# Patient Record
Sex: MALE | Race: WHITE | Employment: OTHER | URBAN - METROPOLITAN AREA
[De-identification: names, ages, dates, MRNs, and addresses within clinical notes are randomized per-mention and may not be internally consistent; named-entity substitution may affect disease eponyms.]

---

## 2012-06-18 LAB — PSA, EXTERNAL: 0.9

## 2016-12-28 LAB — EF %, EXTERNAL: NORMAL

## 2017-01-03 ENCOUNTER — ANESTHESIA (OUTPATIENT)
Dept: SURGERY | Age: 62
DRG: 039 | End: 2017-01-03
Payer: COMMERCIAL

## 2017-01-03 PROCEDURE — 77030026438 HC STYL ET INTUB CARD -A: Performed by: ANESTHESIOLOGY

## 2017-01-03 PROCEDURE — 74011250636 HC RX REV CODE- 250/636

## 2017-01-03 PROCEDURE — 74011000250 HC RX REV CODE- 250

## 2017-01-03 PROCEDURE — 77030013079 HC BLNKT BAIR HGGR 3M -A: Performed by: ANESTHESIOLOGY

## 2017-01-03 PROCEDURE — 77030008684 HC TU ET CUF COVD -B: Performed by: ANESTHESIOLOGY

## 2017-01-03 RX ORDER — DEXAMETHASONE SODIUM PHOSPHATE 4 MG/ML
INJECTION, SOLUTION INTRA-ARTICULAR; INTRALESIONAL; INTRAMUSCULAR; INTRAVENOUS; SOFT TISSUE AS NEEDED
Status: DISCONTINUED | OUTPATIENT
Start: 2017-01-03 | End: 2017-01-03 | Stop reason: HOSPADM

## 2017-01-03 RX ORDER — HEPARIN SODIUM 1000 [USP'U]/ML
INJECTION, SOLUTION INTRAVENOUS; SUBCUTANEOUS AS NEEDED
Status: DISCONTINUED | OUTPATIENT
Start: 2017-01-03 | End: 2017-01-03 | Stop reason: HOSPADM

## 2017-01-03 RX ORDER — PROPOFOL 10 MG/ML
INJECTION, EMULSION INTRAVENOUS AS NEEDED
Status: DISCONTINUED | OUTPATIENT
Start: 2017-01-03 | End: 2017-01-03 | Stop reason: HOSPADM

## 2017-01-03 RX ORDER — PHENYLEPHRINE HCL IN 0.9% NACL 0.4MG/10ML
SYRINGE (ML) INTRAVENOUS AS NEEDED
Status: DISCONTINUED | OUTPATIENT
Start: 2017-01-03 | End: 2017-01-03 | Stop reason: HOSPADM

## 2017-01-03 RX ORDER — MORPHINE SULFATE 10 MG/ML
INJECTION, SOLUTION INTRAMUSCULAR; INTRAVENOUS AS NEEDED
Status: DISCONTINUED | OUTPATIENT
Start: 2017-01-03 | End: 2017-01-03 | Stop reason: HOSPADM

## 2017-01-03 RX ORDER — ONDANSETRON 2 MG/ML
INJECTION INTRAMUSCULAR; INTRAVENOUS AS NEEDED
Status: DISCONTINUED | OUTPATIENT
Start: 2017-01-03 | End: 2017-01-03 | Stop reason: HOSPADM

## 2017-01-03 RX ORDER — GLYCOPYRROLATE 0.2 MG/ML
INJECTION INTRAMUSCULAR; INTRAVENOUS AS NEEDED
Status: DISCONTINUED | OUTPATIENT
Start: 2017-01-03 | End: 2017-01-03 | Stop reason: HOSPADM

## 2017-01-03 RX ORDER — ESMOLOL HYDROCHLORIDE 10 MG/ML
INJECTION INTRAVENOUS AS NEEDED
Status: DISCONTINUED | OUTPATIENT
Start: 2017-01-03 | End: 2017-01-03 | Stop reason: HOSPADM

## 2017-01-03 RX ORDER — MIDAZOLAM HYDROCHLORIDE 1 MG/ML
INJECTION, SOLUTION INTRAMUSCULAR; INTRAVENOUS AS NEEDED
Status: DISCONTINUED | OUTPATIENT
Start: 2017-01-03 | End: 2017-01-03 | Stop reason: HOSPADM

## 2017-01-03 RX ORDER — NICARDIPINE HYDROCHLORIDE 0.2 MG/ML
INJECTION INTRAVENOUS
Status: DISCONTINUED | OUTPATIENT
Start: 2017-01-03 | End: 2017-01-03 | Stop reason: HOSPADM

## 2017-01-03 RX ORDER — NEOSTIGMINE METHYLSULFATE 1 MG/ML
INJECTION INTRAVENOUS AS NEEDED
Status: DISCONTINUED | OUTPATIENT
Start: 2017-01-03 | End: 2017-01-03 | Stop reason: HOSPADM

## 2017-01-03 RX ORDER — FENTANYL CITRATE 50 UG/ML
INJECTION, SOLUTION INTRAMUSCULAR; INTRAVENOUS AS NEEDED
Status: DISCONTINUED | OUTPATIENT
Start: 2017-01-03 | End: 2017-01-03 | Stop reason: HOSPADM

## 2017-01-03 RX ORDER — LIDOCAINE HYDROCHLORIDE 20 MG/ML
INJECTION, SOLUTION EPIDURAL; INFILTRATION; INTRACAUDAL; PERINEURAL AS NEEDED
Status: DISCONTINUED | OUTPATIENT
Start: 2017-01-03 | End: 2017-01-03 | Stop reason: HOSPADM

## 2017-01-03 RX ORDER — PROTAMINE SULFATE 10 MG/ML
INJECTION, SOLUTION INTRAVENOUS AS NEEDED
Status: DISCONTINUED | OUTPATIENT
Start: 2017-01-03 | End: 2017-01-03 | Stop reason: HOSPADM

## 2017-01-03 RX ORDER — ROCURONIUM BROMIDE 10 MG/ML
INJECTION, SOLUTION INTRAVENOUS AS NEEDED
Status: DISCONTINUED | OUTPATIENT
Start: 2017-01-03 | End: 2017-01-03 | Stop reason: HOSPADM

## 2017-01-03 RX ORDER — SUCCINYLCHOLINE CHLORIDE 20 MG/ML
INJECTION INTRAMUSCULAR; INTRAVENOUS AS NEEDED
Status: DISCONTINUED | OUTPATIENT
Start: 2017-01-03 | End: 2017-01-03 | Stop reason: HOSPADM

## 2017-01-03 RX ADMIN — HEPARIN SODIUM 5000 UNITS: 1000 INJECTION, SOLUTION INTRAVENOUS; SUBCUTANEOUS at 13:44

## 2017-01-03 RX ADMIN — DEXAMETHASONE SODIUM PHOSPHATE 4 MG: 4 INJECTION, SOLUTION INTRA-ARTICULAR; INTRALESIONAL; INTRAMUSCULAR; INTRAVENOUS; SOFT TISSUE at 13:33

## 2017-01-03 RX ADMIN — MIDAZOLAM HYDROCHLORIDE 2 MG: 1 INJECTION, SOLUTION INTRAMUSCULAR; INTRAVENOUS at 13:09

## 2017-01-03 RX ADMIN — SUCCINYLCHOLINE CHLORIDE 140 MG: 20 INJECTION INTRAMUSCULAR; INTRAVENOUS at 13:21

## 2017-01-03 RX ADMIN — PROPOFOL 50 MG: 10 INJECTION, EMULSION INTRAVENOUS at 13:26

## 2017-01-03 RX ADMIN — LIDOCAINE HYDROCHLORIDE 40 MG: 20 INJECTION, SOLUTION EPIDURAL; INFILTRATION; INTRACAUDAL; PERINEURAL at 13:21

## 2017-01-03 RX ADMIN — CEFAZOLIN 2 G: 1 INJECTION, POWDER, FOR SOLUTION INTRAMUSCULAR; INTRAVENOUS; PARENTERAL at 13:21

## 2017-01-03 RX ADMIN — ROCURONIUM BROMIDE 6 MG: 10 INJECTION, SOLUTION INTRAVENOUS at 13:21

## 2017-01-03 RX ADMIN — NEOSTIGMINE METHYLSULFATE 3 MG: 1 INJECTION INTRAVENOUS at 14:25

## 2017-01-03 RX ADMIN — MORPHINE SULFATE 4 MG: 10 INJECTION, SOLUTION INTRAMUSCULAR; INTRAVENOUS at 13:47

## 2017-01-03 RX ADMIN — FENTANYL CITRATE 100 MCG: 50 INJECTION, SOLUTION INTRAMUSCULAR; INTRAVENOUS at 13:21

## 2017-01-03 RX ADMIN — PROTAMINE SULFATE 50 MG: 10 INJECTION, SOLUTION INTRAVENOUS at 14:20

## 2017-01-03 RX ADMIN — GLYCOPYRROLATE 0.4 MG: 0.2 INJECTION INTRAMUSCULAR; INTRAVENOUS at 14:25

## 2017-01-03 RX ADMIN — Medication 80 MCG: at 13:35

## 2017-01-03 RX ADMIN — NICARDIPINE HYDROCHLORIDE 5 MG/HR: 0.2 INJECTION INTRAVENOUS at 14:41

## 2017-01-03 RX ADMIN — ESMOLOL HYDROCHLORIDE 30 MG: 10 INJECTION INTRAVENOUS at 13:27

## 2017-01-03 RX ADMIN — ONDANSETRON 4 MG: 2 INJECTION INTRAMUSCULAR; INTRAVENOUS at 14:23

## 2017-01-03 RX ADMIN — PROPOFOL 150 MG: 10 INJECTION, EMULSION INTRAVENOUS at 13:21

## 2017-01-03 RX ADMIN — ROCURONIUM BROMIDE 24 MG: 10 INJECTION, SOLUTION INTRAVENOUS at 13:28

## 2017-01-04 PROBLEM — I65.29 CAROTID STENOSIS, SYMPTOMATIC W/O INFARCT: Status: ACTIVE | Noted: 2017-01-04

## 2017-04-20 LAB — CREATININE, EXTERNAL: 1.08

## 2017-06-27 LAB — LDL-C, EXTERNAL: 79

## 2017-07-14 ENCOUNTER — TELEPHONE (OUTPATIENT)
Dept: INTERNAL MEDICINE CLINIC | Age: 62
End: 2017-07-14

## 2017-07-28 ENCOUNTER — LAB ONLY (OUTPATIENT)
Dept: INTERNAL MEDICINE CLINIC | Age: 62
End: 2017-07-28

## 2017-07-28 DIAGNOSIS — E78.5 HYPERLIPIDEMIA, UNSPECIFIED HYPERLIPIDEMIA TYPE: Primary | ICD-10-CM

## 2017-07-29 LAB
CHOLEST SERPL-MCNC: 213 MG/DL (ref 100–199)
HDLC SERPL-MCNC: 40 MG/DL
LDLC SERPL CALC-MCNC: 141 MG/DL (ref 0–99)
TRIGL SERPL-MCNC: 161 MG/DL (ref 0–149)
VLDLC SERPL CALC-MCNC: 32 MG/DL (ref 5–40)

## 2017-07-31 NOTE — PROGRESS NOTES
LDL and TG's elevated  Stop pravastatin and fenofibrate  Change to crestor 20 mg daily(if OK with him)

## 2017-08-02 RX ORDER — ROSUVASTATIN CALCIUM 20 MG/1
20 TABLET, COATED ORAL
Qty: 30 TAB | Refills: 6 | Status: SHIPPED | OUTPATIENT
Start: 2017-08-02 | End: 2018-02-13 | Stop reason: SDUPTHER

## 2017-08-02 NOTE — PROGRESS NOTES
Patients wife notified of results and order for Crestor will be sent to Dr. Fernando Bose to [facility] to [facility]

## 2017-09-13 PROBLEM — M25.571 RIGHT ANKLE PAIN: Status: ACTIVE | Noted: 2017-09-13

## 2017-09-13 PROBLEM — R05.9 COUGH: Status: ACTIVE | Noted: 2017-09-13

## 2017-09-13 PROBLEM — Z86.69 HISTORY OF GUILLAIN-BARRE SYNDROME: Status: ACTIVE | Noted: 2017-09-13

## 2017-09-13 PROBLEM — Z79.899 LONG-TERM USE OF HIGH-RISK MEDICATION: Status: ACTIVE | Noted: 2017-09-13

## 2017-09-13 PROBLEM — E78.5 DYSLIPIDEMIA: Status: ACTIVE | Noted: 2017-09-13

## 2017-09-13 PROBLEM — L30.9 HAND ECZEMA: Status: ACTIVE | Noted: 2017-09-13

## 2017-09-13 PROBLEM — G45.9 TIA (TRANSIENT ISCHEMIC ATTACK): Status: ACTIVE | Noted: 2017-09-13

## 2017-09-13 PROBLEM — I10 ESSENTIAL HYPERTENSION: Status: ACTIVE | Noted: 2017-09-13

## 2017-09-13 PROBLEM — M76.821 POSTERIOR TIBIAL TENDINITIS OF RIGHT LEG: Status: ACTIVE | Noted: 2017-09-13

## 2017-09-13 PROBLEM — I65.22 STENOSIS OF LEFT CAROTID ARTERY: Status: ACTIVE | Noted: 2017-09-13

## 2017-09-13 PROBLEM — B02.9 HERPES ZOSTER WITHOUT COMPLICATIONS: Status: ACTIVE | Noted: 2017-09-13

## 2017-09-13 RX ORDER — FENOFIBRATE 54 MG/1
TABLET ORAL DAILY
COMMUNITY
End: 2017-10-17 | Stop reason: ALTCHOICE

## 2017-10-17 ENCOUNTER — OFFICE VISIT (OUTPATIENT)
Dept: INTERNAL MEDICINE CLINIC | Age: 62
End: 2017-10-17

## 2017-10-17 VITALS
BODY MASS INDEX: 24.89 KG/M2 | DIASTOLIC BLOOD PRESSURE: 72 MMHG | HEART RATE: 72 BPM | HEIGHT: 72 IN | SYSTOLIC BLOOD PRESSURE: 124 MMHG | WEIGHT: 183.8 LBS

## 2017-10-17 DIAGNOSIS — I10 ESSENTIAL HYPERTENSION: Primary | ICD-10-CM

## 2017-10-17 DIAGNOSIS — R53.82 CHRONIC FATIGUE: ICD-10-CM

## 2017-10-17 DIAGNOSIS — G45.9 TRANSIENT CEREBRAL ISCHEMIA, UNSPECIFIED TYPE: ICD-10-CM

## 2017-10-17 DIAGNOSIS — E78.5 DYSLIPIDEMIA: ICD-10-CM

## 2017-10-17 DIAGNOSIS — Z79.899 LONG-TERM USE OF HIGH-RISK MEDICATION: ICD-10-CM

## 2017-10-17 NOTE — PATIENT INSTRUCTIONS
Fatigue: Care Instructions  Your Care Instructions  Fatigue is a feeling of tiredness, exhaustion, or lack of energy. You may feel fatigue because of too much or not enough activity. It can also come from stress, lack of sleep, boredom, and poor diet. Many medical problems, such as viral infections, can cause fatigue. Emotional problems, especially depression, are often the cause of fatigue. Fatigue is most often a symptom of another problem. Treatment for fatigue depends on the cause. For example, if you have fatigue because you have a certain health problem, treating this problem also treats your fatigue. If depression or anxiety is the cause, treatment may help. Follow-up care is a key part of your treatment and safety. Be sure to make and go to all appointments, and call your doctor if you are having problems. It's also a good idea to know your test results and keep a list of the medicines you take. How can you care for yourself at home? · Get regular exercise. But don't overdo it. Go back and forth between rest and exercise. · Get plenty of rest.  · Eat a healthy diet. Do not skip meals, especially breakfast.  · Reduce your use of caffeine, tobacco, and alcohol. Caffeine is most often found in coffee, tea, cola drinks, and chocolate. · Limit medicines that can cause fatigue. This includes tranquilizers and cold and allergy medicines. When should you call for help? Watch closely for changes in your health, and be sure to contact your doctor if:  · You have new symptoms such as fever or a rash. · Your fatigue gets worse. · You have been feeling down, depressed, or hopeless. Or you may have lost interest in things that you usually enjoy. · You are not getting better as expected. Where can you learn more? Go to http://matthew-migel.info/. Enter N952 in the search box to learn more about \"Fatigue: Care Instructions. \"  Current as of: March 20, 2017  Content Version: 11.3  © 0140-8925 Healthwise, Incorporated. Care instructions adapted under license by [employer] (which disclaims liability or warranty for this information). If you have questions about a medical condition or this instruction, always ask your healthcare professional. [place] [address] any warranty or liability for your use of this information.

## 2017-10-17 NOTE — PROGRESS NOTES
Dominique Mayorga is a 58 y.o. male presenting for Hypertension (6 mo fu)  . 1. Have you been to the ER, urgent care clinic since your last visit? Hospitalized since your last visit? No    2. Have you seen or consulted any other health care providers outside of the [address] [place] [address] since your last visit? Include any pap smears or colon screening. Yes When: 8-25-17 Where: Dr Shanna Louise for visit: doppler fu    No flowsheet data found. No flowsheet data found. PHQ over the last two weeks 10/17/2017   Little interest or pleasure in doing things Not at all   Feeling down, depressed or hopeless Not at all   Total Score PHQ 2 0       There are no discontinued medications.

## 2017-10-17 NOTE — MR AVS SNAPSHOT
Visit Information Date & Time Provider Department Dept. Phone Encounter #  
 10/17/2017  8:40 AM Aislinn CaKate [id number] Follow-up Instructions Return in about 6 months (around 4/17/2018). Follow-up and Disposition History Upcoming Health Maintenance Date Due Hepatitis C Screening 1955 DTaP/Tdap/Td series (1 - Tdap) 9/25/1976 FOBT Q 1 YEAR AGE 50-75 9/25/2005 ZOSTER VACCINE AGE 60> 7/25/2015 INFLUENZA AGE 9 TO ADULT 8/1/2017 Allergies as of 10/17/2017  Review Complete On: 10/17/2017 By: Aislinn Ca MD  
  
 Severity Noted Reaction Type Reactions Influenza Virus Vaccine, Specific  01/03/2017    Other (comments) Best Covarrubias Current Immunizations  Never Reviewed No immunizations on file. Not reviewed this visit You Were Diagnosed With   
  
 Codes Comments Essential hypertension    -  Primary ICD-10-CM: I10 
ICD-9-CM: 401.9 Dyslipidemia     ICD-10-CM: E78.5 ICD-9-CM: 272.4 Long-term use of high-risk medication     ICD-10-CM: Z79.899 ICD-9-CM: V58.69 Transient cerebral ischemia, unspecified type     ICD-10-CM: G45.9 ICD-9-CM: 435.9 Chronic fatigue     ICD-10-CM: R53.82 
ICD-9-CM: 780.79 Vitals BP Pulse Height(growth percentile) Weight(growth percentile) BMI Smoking Status 124/72 (BP 1 Location: Right arm, BP Patient Position: Sitting) 72 6' (1.829 m) 183 lb 12.8 oz (83.4 kg) 24.93 kg/m2 Never Smoker BMI and BSA Data Body Mass Index Body Surface Area 24.93 kg/m 2 2.06 m 2 Preferred Pharmacy Pharmacy Name Phone [id number] [facility] [phone] Your Updated Medication List  
  
   
This list is accurate as of: 10/17/17  9:26 AM.  Always use your most recent med list.  ALLEGRA PO  
 Take 1 Tab by mouth every morning. aspirin delayed-release 81 mg tablet Take 1 Tab by mouth daily. multivitamin tablet Commonly known as:  ONE A DAY Take 1 Tab by mouth daily. rosuvastatin 20 mg tablet Commonly known as:  CRESTOR Take 1 Tab by mouth nightly. valsartan 80 mg tablet Commonly known as:  DIOVAN Take 160 mg by mouth nightly. We Performed the Following CBC WITH AUTOMATED DIFF [21822 CPT(R)] CK [id number] CPT(R)] HEMOGLOBIN A1C WITH EAG [16666 CPT(R)] LIPID PANEL [55728 CPT(R)] METABOLIC PANEL, COMPREHENSIVE [43427 CPT(R)] T4, FREE [id number] CPT(R)] TSH 3RD GENERATION [93913 CPT(R)] URINALYSIS W/ RFLX MICROSCOPIC [70157 CPT(R)] Follow-up Instructions Return in about 6 months (around 4/17/2018). Patient Instructions Fatigue: Care Instructions Your Care Instructions Fatigue is a feeling of tiredness, exhaustion, or lack of energy. You may feel fatigue because of too much or not enough activity. It can also come from stress, lack of sleep, boredom, and poor diet. Many medical problems, such as viral infections, can cause fatigue. Emotional problems, especially depression, are often the cause of fatigue. Fatigue is most often a symptom of another problem. Treatment for fatigue depends on the cause. For example, if you have fatigue because you have a certain health problem, treating this problem also treats your fatigue. If depression or anxiety is the cause, treatment may help. Follow-up care is a key part of your treatment and safety. Be sure to make and go to all appointments, and call your doctor if you are having problems. It's also a good idea to know your test results and keep a list of the medicines you take. How can you care for yourself at home? · Get regular exercise. But don't overdo it. Go back and forth between rest and exercise.  
· Get plenty of rest. 
 · Eat a healthy diet. Do not skip meals, especially breakfast. 
· Reduce your use of caffeine, tobacco, and alcohol. Caffeine is most often found in coffee, tea, cola drinks, and chocolate. · Limit medicines that can cause fatigue. This includes tranquilizers and cold and allergy medicines. When should you call for help? Watch closely for changes in your health, and be sure to contact your doctor if: 
· You have new symptoms such as fever or a rash. · Your fatigue gets worse. · You have been feeling down, depressed, or hopeless. Or you may have lost interest in things that you usually enjoy. · You are not getting better as expected. Where can you learn more? Go to http://matthew-migel.info/. Enter B896 in the search box to learn more about \"Fatigue: Care Instructions. \" Current as of: March 20, 2017 Content Version: 11.3 © 4105-9042 [employer]. Care instructions adapted under license by [employer] (which disclaims liability or warranty for this information). If you have questions about a medical condition or this instruction, always ask your healthcare professional. [place] [address] any warranty or liability for your use of this information. Patient Instructions History Introducing [facility] HE[facility] SERVICES! Dear Yissel Velasquez: Thank you for requesting a [employer] account. Our records indicate that you have previously registered for a [employer] account but its currently inactive. Please call our [employer] support line at [phone]. Additional Information If you have questions, please visit the Frequently Asked Questions section of the [employer] website at [email or web address] Remember, [employer] is NOT to be used for urgent needs. For medical emergencies, dial 911. Now available from your iPhone and Android! Please provide this summary of care documentation to your next provider. Your primary care clinician is listed as ANNA Torre. If you have any questions after today's visit, please call [phone].

## 2017-10-18 LAB
ALBUMIN SERPL-MCNC: 4.8 G/DL (ref 3.6–4.8)
ALBUMIN/GLOB SERPL: 1.8 {RATIO} (ref 1.2–2.2)
ALP SERPL-CCNC: 101 IU/L (ref 39–117)
ALT SERPL-CCNC: 36 IU/L (ref 0–44)
APPEARANCE UR: CLEAR
AST SERPL-CCNC: 33 IU/L (ref 0–40)
BASOPHILS # BLD AUTO: 0 X10E3/UL (ref 0–0.2)
BASOPHILS NFR BLD AUTO: 1 %
BILIRUB SERPL-MCNC: 1.2 MG/DL (ref 0–1.2)
BILIRUB UR QL STRIP: NEGATIVE
BUN SERPL-MCNC: 10 MG/DL (ref 8–27)
BUN/CREAT SERPL: 9 (ref 10–24)
CALCIUM SERPL-MCNC: 9.8 MG/DL (ref 8.6–10.2)
CHLORIDE SERPL-SCNC: 102 MMOL/L (ref 96–106)
CHOLEST SERPL-MCNC: 158 MG/DL (ref 100–199)
CK SERPL-CCNC: 58 U/L (ref 24–204)
CO2 SERPL-SCNC: 25 MMOL/L (ref 18–29)
COLOR UR: YELLOW
CREAT SERPL-MCNC: 1.14 MG/DL (ref 0.76–1.27)
EOSINOPHIL # BLD AUTO: 0.2 X10E3/UL (ref 0–0.4)
EOSINOPHIL NFR BLD AUTO: 2 %
ERYTHROCYTE [DISTWIDTH] IN BLOOD BY AUTOMATED COUNT: 13 % (ref 12.3–15.4)
EST. AVERAGE GLUCOSE BLD GHB EST-MCNC: 120 MG/DL
GLOBULIN SER CALC-MCNC: 2.7 G/DL (ref 1.5–4.5)
GLUCOSE SERPL-MCNC: 119 MG/DL (ref 65–99)
GLUCOSE UR QL: NEGATIVE
HBA1C MFR BLD: 5.8 % (ref 4.8–5.6)
HCT VFR BLD AUTO: 45.3 % (ref 37.5–51)
HDLC SERPL-MCNC: 40 MG/DL
HGB BLD-MCNC: 15.4 G/DL (ref 12.6–17.7)
HGB UR QL STRIP: NEGATIVE
IMM GRANULOCYTES # BLD: 0 X10E3/UL (ref 0–0.1)
IMM GRANULOCYTES NFR BLD: 0 %
KETONES UR QL STRIP: NEGATIVE
LDLC SERPL CALC-MCNC: 83 MG/DL (ref 0–99)
LEUKOCYTE ESTERASE UR QL STRIP: NEGATIVE
LYMPHOCYTES # BLD AUTO: 1.5 X10E3/UL (ref 0.7–3.1)
LYMPHOCYTES NFR BLD AUTO: 20 %
MCH RBC QN AUTO: 31.2 PG (ref 26.6–33)
MCHC RBC AUTO-ENTMCNC: 34 G/DL (ref 31.5–35.7)
MCV RBC AUTO: 92 FL (ref 79–97)
MICRO URNS: NORMAL
MONOCYTES # BLD AUTO: 0.9 X10E3/UL (ref 0.1–0.9)
MONOCYTES NFR BLD AUTO: 12 %
NEUTROPHILS # BLD AUTO: 4.9 X10E3/UL (ref 1.4–7)
NEUTROPHILS NFR BLD AUTO: 65 %
NITRITE UR QL STRIP: NEGATIVE
PH UR STRIP: 5.5 [PH] (ref 5–7.5)
PLATELET # BLD AUTO: 216 X10E3/UL (ref 150–379)
POTASSIUM SERPL-SCNC: 4.6 MMOL/L (ref 3.5–5.2)
PROT SERPL-MCNC: 7.5 G/DL (ref 6–8.5)
PROT UR QL STRIP: NEGATIVE
RBC # BLD AUTO: 4.93 X10E6/UL (ref 4.14–5.8)
SODIUM SERPL-SCNC: 141 MMOL/L (ref 134–144)
SP GR UR: 1.01 (ref 1–1.03)
T4 FREE SERPL-MCNC: 1.07 NG/DL (ref 0.82–1.77)
TRIGL SERPL-MCNC: 173 MG/DL (ref 0–149)
TSH SERPL DL<=0.005 MIU/L-ACNC: 1.05 UIU/ML (ref 0.45–4.5)
UROBILINOGEN UR STRIP-MCNC: 0.2 MG/DL (ref 0.2–1)
VLDLC SERPL CALC-MCNC: 35 MG/DL (ref 5–40)
WBC # BLD AUTO: 7.4 X10E3/UL (ref 3.4–10.8)

## 2017-10-18 NOTE — PROGRESS NOTES
His labs are all stable and there is nothing that gives us any indication of why he is suffering with such fatigue.   Should consider having a sleep lab study done to rule out obstructive sleep apnea

## 2018-02-12 RX ORDER — VALSARTAN 160 MG/1
TABLET ORAL
Qty: 30 TAB | Refills: 11 | Status: SHIPPED | OUTPATIENT
Start: 2018-02-12 | End: 2018-02-14 | Stop reason: SDUPTHER

## 2018-02-14 RX ORDER — VALSARTAN 160 MG/1
TABLET ORAL
Qty: 30 TAB | Refills: 11 | Status: SHIPPED | OUTPATIENT
Start: 2018-02-14 | End: 2018-02-19 | Stop reason: SDUPTHER

## 2018-02-19 ENCOUNTER — OFFICE VISIT (OUTPATIENT)
Dept: INTERNAL MEDICINE CLINIC | Age: 63
End: 2018-02-19

## 2018-02-19 VITALS
DIASTOLIC BLOOD PRESSURE: 82 MMHG | HEIGHT: 72 IN | BODY MASS INDEX: 24.38 KG/M2 | HEART RATE: 69 BPM | SYSTOLIC BLOOD PRESSURE: 158 MMHG | WEIGHT: 180 LBS | RESPIRATION RATE: 20 BRPM | TEMPERATURE: 97.9 F

## 2018-02-19 DIAGNOSIS — L23.9 ALLERGIC DERMATITIS: Primary | ICD-10-CM

## 2018-02-19 RX ORDER — PREDNISONE 5 MG/1
TABLET ORAL
Qty: 30 TAB | Refills: 0 | Status: SHIPPED | OUTPATIENT
Start: 2018-02-19 | End: 2018-03-19 | Stop reason: ALTCHOICE

## 2018-02-19 NOTE — MR AVS SNAPSHOT
Moshe Arriaga 
 
 
 [address] [address] [phone] Patient: Anila Leong MRN: [id number] HIN:[birth year] Visit Information Date & Time Provider Department Dept. Phone Encounter #  
 2/19/2018  3:50 PM Cherri Lindquist MD [facility] [id number] Follow-up Instructions Return for As previously scheduled. Your Appointments 4/20/2018  8:00 AM  
FOLLOW UP 10 with MD Marcie Zapata [address] ([facility]) Appt Note: 6 mo fu  
 [address] [address] [address] [address] Upcoming Health Maintenance Date Due Hepatitis C Screening 1955 DTaP/Tdap/Td series (1 - Tdap) 9/25/1976 FOBT Q 1 YEAR AGE 50-75 9/25/2005 ZOSTER VACCINE AGE 60> 7/25/2015 Influenza Age 5 to Adult 8/1/2017 Allergies as of 2/19/2018  Review Complete On: 2/19/2018 By: Cherri Lindquist MD  
  
 Severity Noted Reaction Type Reactions Influenza Virus Vaccine, Specific  01/03/2017    Other (comments) Pearl Carvajal Current Immunizations  Never Reviewed No immunizations on file. Not reviewed this visit You Were Diagnosed With   
  
 Codes Comments Allergic dermatitis    -  Primary ICD-10-CM: L23.9 ICD-9-CM: 692.9 Vitals BP Pulse Temp Resp Height(growth percentile) Weight(growth percentile) 158/82 (BP 1 Location: Left arm, BP Patient Position: Sitting) 69 97.9 °F (36.6 °C) (Oral) 20 6' (1.829 m) 180 lb (81.6 kg) BMI Smoking Status 24.41 kg/m2 Never Smoker Vitals History BMI and BSA Data Body Mass Index Body Surface Area  
 24.41 kg/m 2 2.04 m 2 Preferred Pharmacy Pharmacy Name Phone  [id number] [facility] [phone] Your Updated Medication List  
  
   
This list is accurate as of: 18  4:22 PM.  Always use your most recent med list. ALLEGRA PO Take 1 Tab by mouth as needed. aspirin delayed-release 81 mg tablet Take 1 Tab by mouth daily. multivitamin tablet Commonly known as:  ONE A DAY Take 1 Tab by mouth daily. predniSONE 5 mg tablet Commonly known as:  DELTASONE  
5 tablets day for days 1 through 4, then 4 tablets on day 5, then 3 tablets on day 6, then 2 tablets on day 7, then 1 tablet on day 8.  
  
 rosuvastatin 20 mg tablet Commonly known as:  CRESTOR  
TAKE 1 TAB BY MOUTH NIGHTLY.  
  
 valsartan 80 mg tablet Commonly known as:  DIOVAN Take 160 mg by mouth nightly. Prescriptions Sent to Pharmacy Refills  
 predniSONE (DELTASONE) 5 mg tablet 0 Si tablets day for days 1 through 4, then 4 tablets on day 5, then 3 tablets on day 6, then 2 tablets on day 7, then 1 tablet on day 8. Class: Normal  
 Pharmacy: [address] #: [phone] Follow-up Instructions Return for As previously scheduled. Patient Instructions Dermatitis: Care Instructions Your Care Instructions Dermatitis is the general name used for any rash or inflammation of the skin. Different kinds of dermatitis cause different kinds of rashes. Common causes of a rash include new medicines, plants (such as poison oak or poison ivy), heat, and stress. Certain illnesses can also cause a rash. An allergic reaction to something that touches your skin, such as latex, nickel, or poison ivy, is called contact dermatitis. Contact dermatitis may also be caused by something that irritates the skin, such as bleach, a chemical, or soap. These types of rashes cannot be spread from person to person. How long your rash will last depends on what caused it. Rashes may last a few days or months. Follow-up care is a key part of your treatment and safety. Be sure to make and go to all appointments, and call your doctor if you are having problems. It's also a good idea to know your test results and keep a list of the medicines you take. How can you care for yourself at home? · Do not scratch the rash. Cut your nails short, and file them smooth. Or wear gloves if this helps keep you from scratching. · Wash the area with water only. Pat dry. · Put cold, wet cloths on the rash to reduce itching. · Keep cool, and stay out of the sun. · Leave the rash open to the air as much as possible. · If the rash itches, use hydrocortisone cream. Follow the directions on the label. Calamine lotion may help for plant rashes. · Take an over-the-counter antihistamine, such as diphenhydramine (Benadryl) or loratadine (Claritin), to help calm the itching. Read and follow all instructions on the label. · If your doctor prescribed a cream, use it as directed. If your doctor prescribed medicine, take it exactly as directed. When should you call for help? Call your doctor now or seek immediate medical care if: 
? · You have symptoms of infection, such as: 
¨ Increased pain, swelling, warmth, or redness. ¨ Red streaks leading from the area. ¨ Pus draining from the area. ¨ A fever. ? · You have joint pain along with the rash. ? Watch closely for changes in your health, and be sure to contact your doctor if: 
? · Your rash is changing or getting worse. ? · You are not getting better as expected. Where can you learn more? Go to http://matthew-migel.info/. Enter [phone] [phone] in the search box to learn more about \"Dermatitis: Care Instructions. \" Current as of: October 13, 2016 Content Version: 11.4 © 6063-5343 Healthwise, Incorporated.  Care instructions adapted under license by [employer]ra [employer] (which disclaims liability or warranty for this information). If you have questions about a medical condition or this instruction, always ask your healthcare professional. [address] any warranty or liability for your use of this information. Introducing [facility] & HE[facility] SERVICES! Dear Cristy Davis: Thank you for requesting a [employer] account. Our records indicate that you have previously registered for a [employer] account but its currently inactive. Please call our [employer] support line at [phone]. Additional Information If you have questions, please visit the Frequently Asked Questions section of the [employer] website at [email or web address] Remember, [employer] is NOT to be used for urgent needs. For medical emergencies, dial 911. Now available from your iPhone and Android! Please provide this summary of care documentation to your next provider. Your primary care clinician is listed as ANNA Torre. If you have any questions after today's visit, please call [phone].

## 2018-02-19 NOTE — PROGRESS NOTES
Chief Complaint   Patient presents with    Rash     Patient stated he has a rash on his back, lower arms, and left upper thigh and he has been itching. 1. Have you been to the ER, urgent care clinic since your last visit? Hospitalized since your last visit? NO    2. Have you seen or consulted any other health care providers outside of the [address] [place] [address] since your last visit? Include any pap smears or colon screening.  NO

## 2018-02-19 NOTE — PATIENT INSTRUCTIONS
Dermatitis: Care Instructions  Your Care Instructions  Dermatitis is the general name used for any rash or inflammation of the skin. Different kinds of dermatitis cause different kinds of rashes. Common causes of a rash include new medicines, plants (such as poison oak or poison ivy), heat, and stress. Certain illnesses can also cause a rash. An allergic reaction to something that touches your skin, such as latex, nickel, or poison ivy, is called contact dermatitis. Contact dermatitis may also be caused by something that irritates the skin, such as bleach, a chemical, or soap. These types of rashes cannot be spread from person to person. How long your rash will last depends on what caused it. Rashes may last a few days or months. Follow-up care is a key part of your treatment and safety. Be sure to make and go to all appointments, and call your doctor if you are having problems. It's also a good idea to know your test results and keep a list of the medicines you take. How can you care for yourself at home? · Do not scratch the rash. Cut your nails short, and file them smooth. Or wear gloves if this helps keep you from scratching. · Wash the area with water only. Pat dry. · Put cold, wet cloths on the rash to reduce itching. · Keep cool, and stay out of the sun. · Leave the rash open to the air as much as possible. · If the rash itches, use hydrocortisone cream. Follow the directions on the label. Calamine lotion may help for plant rashes. · Take an over-the-counter antihistamine, such as diphenhydramine (Benadryl) or loratadine (Claritin), to help calm the itching. Read and follow all instructions on the label. · If your doctor prescribed a cream, use it as directed. If your doctor prescribed medicine, take it exactly as directed. When should you call for help?   Call your doctor now or seek immediate medical care if:  ? · You have symptoms of infection, such as:  ¨ Increased pain, swelling, warmth, or redness. ¨ Red streaks leading from the area. ¨ Pus draining from the area. ¨ A fever. ? · You have joint pain along with the rash. ? Watch closely for changes in your health, and be sure to contact your doctor if:  ? · Your rash is changing or getting worse. ? · You are not getting better as expected. Where can you learn more? Go to http://matthew-migel.info/. Enter ([phone] 4443 5115 in the search box to learn more about \"Dermatitis: Care Instructions. \"  Current as of: October 13, 2016  Content Version: 11.4  © 3081-6016 [employer]. Care instructions adapted under license by [employer] (which disclaims liability or warranty for this information). If you have questions about a medical condition or this instruction, always ask your healthcare professional. [address] any warranty or liability for your use of this information.

## 2018-02-19 NOTE — PROGRESS NOTES
This note will not be viewable in [address] Subjective:     Mr. Radha Sánchez presents to the office today with complaints of an itchy rash. Patient states that is been present for at least 2 weeks and is been on his arms his chest and abdomen is back and his legs. He has had no fevers, chills or other constitutional symptoms. He notes that the family has not changed any soaps, detergents, fabric softeners etc.  He has Allegra available to take for allergies but has not taken any recently. He has been using a back scratcher on the areas and they are excoriated. The patient does have a history of hand eczema which is been chronic. He has not been on any new medications.     Past Medical History:   Diagnosis Date    Cough 9/13/2017    Dyslipidemia 9/13/2017    Eczema     to hands    Essential hypertension 9/13/2017    Guillain Barré syndrome ([facility] [address]     states due to flu shot 40 years ago    Hand eczema 9/13/2017    Herpes zoster without complications 3/97/1363    High cholesterol     History of Guillain-Catron syndrome 9/13/2017    Hx of chest tube placement 1976    Hypertension     Ill-defined condition     polypneumothroax age 24    Long-term use of high-risk medication 9/13/2017    Posterior tibial tendinitis of right leg 9/13/2017    Right ankle pain 9/13/2017    Stenosis of left carotid artery 9/13/2017    Stroke ([facility] [address] 12/21/2016    right leg and facial weakness-no deficits presently but general weakness    TIA (transient ischemic attack) 9/13/2017     Past Surgical History:   Procedure Laterality Date    HX HEENT      septoplasty    HX OTHER SURGICAL      colonoscopy    HX TONSILLECTOMY       Allergies   Allergen Reactions    Influenza Virus Vaccine, Specific Other (comments)     Malou Gutierrez     Current Outpatient Prescriptions   Medication Sig Dispense Refill    predniSONE (DELTASONE) 5 mg tablet 5 tablets day for days 1 through 4, then 4 tablets on day 5, then 3 tablets on day 6, then 2 tablets on day 7, then 1 tablet on day 8. 30 Tab 0    rosuvastatin (CRESTOR) 20 mg tablet TAKE 1 TAB BY MOUTH NIGHTLY. 30 Tab 12    valsartan (DIOVAN) 80 mg tablet Take 160 mg by mouth nightly.  FEXOFENADINE HCL (ALLEGRA PO) Take 1 Tab by mouth as needed.  multivitamin (ONE A DAY) tablet Take 1 Tab by mouth daily.  aspirin delayed-release 81 mg tablet Take 1 Tab by mouth daily. Social History     Social History    Marital status:      Spouse name: N/A    Number of children: N/A    Years of education: N/A     Social History Main Topics    Smoking status: Never Smoker    Smokeless tobacco: Never Used    Alcohol use 12.0 oz/week     20 Cans of beer per week      Comment: none in 10 days    Drug use: No    Sexual activity: Not Asked     Other Topics Concern    None     Social History Narrative     Family History   Problem Relation Age of Onset    No Known Problems Mother     Diabetes Father     Hypertension Father     Cataract Father        Review of Systems:  GEN: no weight loss, weight gain, fatigue or night sweats  CV: no PND, orthopnea, or palpitations  Resp: no dyspnea on exertion, no cough  Abd: no nausea, vomiting or diarrhea  EXT: denies edema, claudication  Derm: Complains of diffuse, itchy rash  Neurological ROS: no TIA or stroke symptoms  ROS otherwise negative      Objective:     Visit Vitals    /82 (BP 1 Location: Left arm, BP Patient Position: Sitting)    Pulse 69    Temp 97.9 °F (36.6 °C) (Oral)    Resp 20    Ht 6' (1.829 m)    Wt 180 lb (81.6 kg)    BMI 24.41 kg/m2     Body mass index is 24.41 kg/(m^2). General:   alert, cooperative and no distress   Eyes: conjunctivae/sclerae clear.  PERRL, EOM's intact   Mouth:  No oral lesions, no pharyngeal erythema, no exudates   Neck: Trachea midline, no thyromegaly, no bruits   Heart: S1 and S2 normal,no murmurs noted    Lungs: Clear to auscultation bilaterally, no increased work of breathing   Abdomen: Soft, nontender. Normal bowel sounds   Derm:  There is a diffuse excoriated rash present on his arms chest abdomen back and legs. There are no vesicles present. There are no hives or bull's-eye lesions. He does have bilateral hand eczema with some mild cracking present. Neuro: ..alert, oriented x3,speech normal in context and clarity, cranial nerves II-XII intact,motor strength: full proximally and distally,gait: normal  reflexes: full and symmetric     Physical exam otherwise negative         Assessment/Plan:     Diagnoses and all orders for this visit:    Allergic dermatitis  -     predniSONE (DELTASONE) 5 mg tablet; 5 tablets day for days 1 through 4, then 4 tablets on day 5, then 3 tablets on day 6, then 2 tablets on day 7, then 1 tablet on day 8., Normal, Disp-30 Tab, R-0        Other instructions: The patient has a diffuse excoriated dermatitis. No vesicles were seen. He does have chronic hand eczema. I believe that this is an allergic dermatitis and we will treat him with a tapering course of prednisone and I have asked him to restart his Allegra. Should there be no improvement in the rash I have recommended a dermatology evaluation    Follow-up Disposition:  Return for As previously scheduled.     Vitaliy Morel MD

## 2018-03-19 ENCOUNTER — OFFICE VISIT (OUTPATIENT)
Dept: INTERNAL MEDICINE CLINIC | Age: 63
End: 2018-03-19

## 2018-03-19 VITALS
DIASTOLIC BLOOD PRESSURE: 78 MMHG | SYSTOLIC BLOOD PRESSURE: 116 MMHG | HEIGHT: 72 IN | TEMPERATURE: 98.1 F | WEIGHT: 179.8 LBS | BODY MASS INDEX: 24.35 KG/M2

## 2018-03-19 DIAGNOSIS — J20.9 ACUTE BRONCHITIS, UNSPECIFIED ORGANISM: Primary | ICD-10-CM

## 2018-03-19 RX ORDER — CEFUROXIME AXETIL 250 MG/1
250 TABLET ORAL 2 TIMES DAILY
Qty: 40 TAB | Refills: 0 | Status: SHIPPED | OUTPATIENT
Start: 2018-03-19 | End: 2018-04-19 | Stop reason: ALTCHOICE

## 2018-03-19 NOTE — PROGRESS NOTES
This note will not be viewable in [address] Fatoumata Giang is a 58 y.o. male and presents with Cough  . Subjective:  Jose Elias Pretty presents to the office today with complaints of an upper respiratory infection ongoing over the last week with the development of a congested cough. The cough is been productive of a purulent phlegm. Initially he had some feverishness without chills. He denies wheezing, shortness of breath or pleuritic pain. Past Medical History:   Diagnosis Date    Cough 9/13/2017    Dyslipidemia 9/13/2017    Eczema     to hands    Essential hypertension 9/13/2017    Guillain Barré syndrome ([facility] [address]     states due to flu shot 40 years ago    Hand eczema 9/13/2017    Herpes zoster without complications 3/32/8606    High cholesterol     History of Guillain-Agenda syndrome 9/13/2017    Hx of chest tube placement 1976    Hypertension     Ill-defined condition     polypneumothroax age 24    Long-term use of high-risk medication 9/13/2017    Posterior tibial tendinitis of right leg 9/13/2017    Right ankle pain 9/13/2017    Stenosis of left carotid artery 9/13/2017    Stroke ([facility] [address] 12/21/2016    right leg and facial weakness-no deficits presently but general weakness    TIA (transient ischemic attack) 9/13/2017     Past Surgical History:   Procedure Laterality Date    HX HEENT      septoplasty    HX OTHER SURGICAL      colonoscopy    HX TONSILLECTOMY       Allergies   Allergen Reactions    Influenza Virus Vaccine, Specific Other (comments)     Malou Gutierrez     Current Outpatient Prescriptions   Medication Sig Dispense Refill    cefUROXime (CEFTIN) 250 mg tablet Take 1 Tab by mouth two (2) times a day. 40 Tab 0    rosuvastatin (CRESTOR) 20 mg tablet TAKE 1 TAB BY MOUTH NIGHTLY. 30 Tab 12    valsartan (DIOVAN) 80 mg tablet Take 160 mg by mouth nightly.  FEXOFENADINE HCL (ALLEGRA PO) Take 1 Tab by mouth as needed.  multivitamin (ONE A DAY) tablet Take 1 Tab by mouth daily.  aspirin delayed-release 81 mg tablet Take 1 Tab by mouth daily. Social History     Social History    Marital status:      Spouse name: N/A    Number of children: N/A    Years of education: N/A     Social History Main Topics    Smoking status: Never Smoker    Smokeless tobacco: Never Used    Alcohol use 12.0 oz/week     20 Cans of beer per week      Comment: none in 10 days    Drug use: No    Sexual activity: Not Asked     Other Topics Concern    None     Social History Narrative     Family History   Problem Relation Age of Onset    No Known Problems Mother     Diabetes Father     Hypertension Father     Cataract Father        Review of Systems  Constitutional: negative for fevers, chills, anorexia and weight loss  Eyes:   negative for visual disturbance and irritation  ENT:   Positive for some sinus congestion and post nasal drainage. Respiratory:  Positive for cough and chest congestion without wheezing  CV:   negative for chest pain, palpitations, lower extremity edema  GI:   negative for nausea, vomiting, diarrhea, abdominal pain,melena  Integumentary: negative for rash and pruritus  Neurological:  negative for headaches, dizziness, vertigo, memory problems and gait       Objective:  Visit Vitals    /78 (BP 1 Location: Left arm, BP Patient Position: Sitting)    Temp 98.1 °F (36.7 °C) (Oral)    Ht 6' (1.829 m)    Wt 179 lb 12.8 oz (81.6 kg)    BMI 24.39 kg/m2     Body mass index is 24.39 kg/(m^2). Physical Exam:   General appearance - alert, ill appearing, and in no distress  Mental status - alert, oriented to person, place, and time  EYE-GAURAV, EOMI, conjuctiva clear. No lid swelling or purulent drainage  ENT- TM's clear without A/F level.  Pharynx slightly erythematous with drainage noted  Nose - normal and patent, no erythema,  Neck - supple, no significant adenopathy   Chest - Coarse upper airway rhonchi present without wheezing   Heart - normal rate, regular rhythm, normal S1, S2, no murmurs, rubs, clicks or gallops   Skin-No rash appreciated  Neuro -alert, oriented, normal speech, no focal findings. Assessment/Plan:  Diagnoses and all orders for this visit:    Acute bronchitis, unspecified organism  -     cefUROXime (CEFTIN) 250 mg tablet; Take 1 Tab by mouth two (2) times a day., Normal, Disp-40 Tab, R-0        Other Instructions:  Mucinex as directed    Increase po fluids    Follow-up Disposition:  Return if symptoms worsen or fail to improve. I have reviewed with the patient details of the assessment and plan and all questions were answered. Relevent patient education was performed. An After Visit Summary was printed and given to the patient.     Yadiel Avalos MD

## 2018-03-19 NOTE — PATIENT INSTRUCTIONS
Bronchitis: Care Instructions  Your Care Instructions    Bronchitis is inflammation of the bronchial tubes, which carry air to the lungs. The tubes swell and produce mucus, or phlegm. The mucus and inflamed bronchial tubes make you cough. You may have trouble breathing. Most cases of bronchitis are caused by viruses like those that cause colds. Antibiotics usually do not help and they may be harmful. Bronchitis usually develops rapidly and lasts about 2 to 3 weeks in otherwise healthy people. Follow-up care is a key part of your treatment and safety. Be sure to make and go to all appointments, and call your doctor if you are having problems. It's also a good idea to know your test results and keep a list of the medicines you take. How can you care for yourself at home? · Take all medicines exactly as prescribed. Call your doctor if you think you are having a problem with your medicine. · Get some extra rest.  · Take an over-the-counter pain medicine, such as acetaminophen (Tylenol), ibuprofen (Advil, Motrin), or naproxen (Aleve) to reduce fever and relieve body aches. Read and follow all instructions on the label. · Do not take two or more pain medicines at the same time unless the doctor told you to. Many pain medicines have acetaminophen, which is Tylenol. Too much acetaminophen (Tylenol) can be harmful. · Take an over-the-counter cough medicine that contains dextromethorphan to help quiet a dry, hacking cough so that you can sleep. Avoid cough medicines that have more than one active ingredient. Read and follow all instructions on the label. · Breathe moist air from a humidifier, hot shower, or sink filled with hot water. The heat and moisture will thin mucus so you can cough it out. · Do not smoke. Smoking can make bronchitis worse. If you need help quitting, talk to your doctor about stop-smoking programs and medicines. These can increase your chances of quitting for good.   When should you call for help? Call 911 anytime you think you may need emergency care. For example, call if:  ? · You have severe trouble breathing. ?Call your doctor now or seek immediate medical care if:  ? · You have new or worse trouble breathing. ? · You cough up dark brown or bloody mucus (sputum). ? · You have a new or higher fever. ? · You have a new rash. ? Watch closely for changes in your health, and be sure to contact your doctor if:  ? · You cough more deeply or more often, especially if you notice more mucus or a change in the color of your mucus. ? · You are not getting better as expected. Where can you learn more? Go to http://matthew-migel.info/. Enter H333 in the search box to learn more about \"Bronchitis: Care Instructions. \"  Current as of: May 12, 2017  Content Version: 11.4  © 3833-9035 [employer]. Care instructions adapted under license by [employer]) by [employer] (which disclaims liability or warranty for this information). If you have questions about a medical condition or this instruction, always ask your healthcare professional. [address] any warranty or liability for your use of this information.

## 2018-03-19 NOTE — MR AVS SNAPSHOT
[facility] 
 
 
 [address] [id number] [phone] Patient: Cade Bahena MRN: [id number] PMO:[birth year] Visit Information Date & Time Provider Department Dept. Phone Encounter #  
 3/19/2018  2:40 PM Marquita Tobias MD [facility] [id number] Follow-up Instructions Return if symptoms worsen or fail to improve. Your Appointments 4/20/2018  8:00 AM  
FOLLOW UP 10 with MD Clair Rajan 26 ([facility]) Appt Note: 6 mo fu  
 [address] [id number] [address] [address] Upcoming Health Maintenance Date Due Hepatitis C Screening 1955 DTaP/Tdap/Td series (1 - Tdap) 9/25/1976 FOBT Q 1 YEAR AGE 50-75 9/25/2005 ZOSTER VACCINE AGE 60> 7/25/2015 Influenza Age 5 to Adult 8/1/2017 Allergies as of 3/19/2018  Review Complete On: 3/19/2018 By: Marquita Tobias MD  
  
 Severity Noted Reaction Type Reactions Influenza Virus Vaccine, Specific  01/03/2017    Other (comments) Mickiel Coad Current Immunizations  Never Reviewed No immunizations on file. Not reviewed this visit You Were Diagnosed With   
  
 Codes Comments Acute bronchitis, unspecified organism    -  Primary ICD-10-CM: J20.9 ICD-9-CM: 466.0 Vitals BP Temp Height(growth percentile) Weight(growth percentile) BMI Smoking Status 116/78 (BP 1 Location: Left arm, BP Patient Position: Sitting) 98.1 °F (36.7 °C) (Oral) 6' (1.829 m) 179 lb 12.8 oz (81.6 kg) 24.39 kg/m2 Never Smoker BMI and BSA Data Body Mass Index Body Surface Area  
 24.39 kg/m 2 2.04 m 2 Preferred Pharmacy Pharmacy Name Phone [phone] [phone] Your Updated Medication List  
  
   
This list is accurate as of 3/19/18  2:45 PM.  Always use your most recent med list. ALLEGRA PO Take 1 Tab by mouth as needed. aspirin delayed-release 81 mg tablet Take 1 Tab by mouth daily. cefUROXime 250 mg tablet Commonly known as:  CEFTIN Take 1 Tab by mouth two (2) times a day. multivitamin tablet Commonly known as:  ONE A DAY Take 1 Tab by mouth daily. rosuvastatin 20 mg tablet Commonly known as:  CRESTOR  
TAKE 1 TAB BY MOUTH NIGHTLY.  
  
 valsartan 80 mg tablet Commonly known as:  DIOVAN Take 160 mg by mouth nightly. Prescriptions Sent to Pharmacy Refills  
 cefUROXime (CEFTIN) 250 mg tablet 0 Sig: Take 1 Tab by mouth two (2) times a day. Class: Normal  
 Pharmacy: [address] #: [phone] Route: Oral  
  
Follow-up Instructions Return if symptoms worsen or fail to improve. Introducing \[facility] [place] [facility]\"" & [facility] SERVICES! Dear Sara Ours: Thank you for requesting a Mobile [employer] account. Our records indicate that you have previously registered for a Mobile [employer] account but its currently inactive. Please call our Mobile Iron support line at [phone]. Additional Information If you have questions, please visit the Frequently Asked Questions section of the Mobile [employer] website at [email or web address] Remember, Mobile Iron is NOT to be used for urgent needs. For medical emergencies, dial 911. Now available from your iPhone and Android! Please provide this summary of care documentation to your next provider. Your primary care clinician is listed as ANNA Torre. If you have any questions after today's visit, please call [phone].

## 2018-03-19 NOTE — PROGRESS NOTES
Isauro Birch is a 58 y.o. male presenting for Cough  . 1. Have you been to the ER, urgent care clinic since your last visit? Hospitalized since your last visit? No    2. Have you seen or consulted any other health care providers outside of the [address] [place] [address] since your last visit? Include any pap smears or colon screening. No    No flowsheet data found. Abuse Screening Questionnaire 10/17/2017   Do you ever feel afraid of your partner? N   Are you in a relationship with someone who physically or mentally threatens you? N   Is it safe for you to go home? Y       PHQ over the last two weeks 2/19/2018   Little interest or pleasure in doing things Not at all   Feeling down, depressed or hopeless Not at all   Total Score PHQ 2 0       There are no discontinued medications.

## 2018-04-19 ENCOUNTER — OFFICE VISIT (OUTPATIENT)
Dept: INTERNAL MEDICINE CLINIC | Age: 63
End: 2018-04-19

## 2018-04-19 VITALS
SYSTOLIC BLOOD PRESSURE: 102 MMHG | HEIGHT: 72 IN | DIASTOLIC BLOOD PRESSURE: 60 MMHG | HEART RATE: 76 BPM | WEIGHT: 179 LBS | BODY MASS INDEX: 24.24 KG/M2 | OXYGEN SATURATION: 97 %

## 2018-04-19 DIAGNOSIS — I10 ESSENTIAL HYPERTENSION: Primary | ICD-10-CM

## 2018-04-19 DIAGNOSIS — E78.5 DYSLIPIDEMIA: ICD-10-CM

## 2018-04-19 DIAGNOSIS — Z79.899 LONG-TERM USE OF HIGH-RISK MEDICATION: ICD-10-CM

## 2018-04-19 NOTE — PROGRESS NOTES
This note will not be viewable in [address] Subjective:     Meliza Lan presents the office today in follow-up of his hypertension and hypercholesterolemia. The patient has hypertension currently on Diovan therapy. He denies dizziness, lower extremity edema, headaches, numbness, tingling or focal neurological problems. Patient has been compliant with his medication. He remains on Crestor for his hyperlipidemia. The patient denies muscle soreness or GI upset. He has no history of ASCVD and denies exertional chest pain or claudication. The patient has had previous carotid artery stenosis and previous TIA. He remains on an aspirin and is had no recurrent symptoms. Strahl level done on 1017 revealed an LDL of 83. Past Medical History:   Diagnosis Date    Cough 9/13/2017    Dyslipidemia 9/13/2017    Eczema     to hands    Essential hypertension 9/13/2017    Guillain Barré syndrome ([facility] [address]     states due to flu shot 40 years ago    Hand eczema 9/13/2017    Herpes zoster without complications 7/47/8091    High cholesterol     History of Guillain-Brunswick syndrome 9/13/2017    Hx of chest tube placement 1976    Hypertension     Ill-defined condition     polypneumothroax age 24    Long-term use of high-risk medication 9/13/2017    Posterior tibial tendinitis of right leg 9/13/2017    Right ankle pain 9/13/2017    Stenosis of left carotid artery 9/13/2017    Stroke ([address] 12/21/2016    right leg and facial weakness-no deficits presently but general weakness    TIA (transient ischemic attack) 9/13/2017     Past Surgical History:   Procedure Laterality Date    HX HEENT      septoplasty    HX OTHER SURGICAL      colonoscopy    HX TONSILLECTOMY       Allergies   Allergen Reactions    Influenza Virus Vaccine, Specific Other (comments)     Malou Gutierrez     Current Outpatient Prescriptions   Medication Sig Dispense Refill    rosuvastatin (CRESTOR) 20 mg tablet TAKE 1 TAB BY MOUTH NIGHTLY.  30 Tab 12    valsartan (DIOVAN) 80 mg tablet Take 160 mg by mouth nightly.  FEXOFENADINE HCL (ALLEGRA PO) Take 1 Tab by mouth as needed.  multivitamin (ONE A DAY) tablet Take 1 Tab by mouth daily.  aspirin delayed-release 81 mg tablet Take 1 Tab by mouth daily. Social History     Social History    Marital status:      Spouse name: N/A    Number of children: N/A    Years of education: N/A     Social History Main Topics    Smoking status: Never Smoker    Smokeless tobacco: Never Used    Alcohol use 12.0 oz/week     20 Cans of beer per week      Comment: none in 10 days    Drug use: No    Sexual activity: Not Asked     Other Topics Concern    None     Social History Narrative     Family History   Problem Relation Age of Onset    No Known Problems Mother     Diabetes Father     Hypertension Father     Cataract Father        Review of Systems:  GEN: no weight loss, weight gain, fatigue or night sweats  CV: no PND, orthopnea, or palpitations  Resp: no dyspnea on exertion, no cough  Abd: no nausea, vomiting or diarrhea  EXT: denies edema, claudication  Endocrine: no hair loss, excessive thirst or polyuria  Neurological ROS: no TIA or stroke symptoms  ROS otherwise negative      Objective:     Visit Vitals    /60 (BP 1 Location: Right arm, BP Patient Position: Sitting)    Pulse 76    Ht 6' (1.829 m)    Wt 179 lb (81.2 kg)    SpO2 97%    BMI 24.28 kg/m2     Body mass index is 24.28 kg/(m^2). General:   alert, cooperative and no distress   Eyes: conjunctivae/sclerae clear. PERRL, EOM's intact   Mouth:  No oral lesions, no pharyngeal erythema, no exudates   Neck: Trachea midline, no thyromegaly, no bruits   Heart: S1 and S2 normal,no murmurs noted    Lungs: Clear to auscultation bilaterally, no increased work of breathing   Abdomen: Soft, nontender.   Normal bowel sounds   Extremities: No edema or cyanosis   Neuro: ..alert, oriented x3,speech normal in context and clarity, cranial nerves II-XII intact,motor strength: full proximally and distally,gait: normal  reflexes: full and symmetric     Physical exam otherwise negative         Assessment/Plan:     Diagnoses and all orders for this visit:    Essential hypertension    Dyslipidemia    Long-term use of high-risk medication        Other instructions: The patient's medications are reviewed and reconciled. No change in his current medical regimen is made. A no added salt, prudent diet is encouraged. Laboratory studies from 10/17 were reviewed with the patient. Have asked him to return for complete checkup in 6 months time    Follow-up Disposition:  Return in about 6 months (around 10/19/2018).     Teagan Chen MD

## 2018-04-19 NOTE — PROGRESS NOTES
Bry Ventura is a 58 y.o. male presenting for Follow-up (6 mo fu)  . 1. Have you been to the ER, urgent care clinic since your last visit? Hospitalized since your last visit? No    2. Have you seen or consulted any other health care providers outside of the [address] [place] [address] since your last visit? Include any pap smears or colon screening. No    No flowsheet data found. Abuse Screening Questionnaire 10/17/2017   Do you ever feel afraid of your partner? N   Are you in a relationship with someone who physically or mentally threatens you? N   Is it safe for you to go home? Y       PHQ over the last two weeks 2/19/2018   Little interest or pleasure in doing things Not at all   Feeling down, depressed or hopeless Not at all   Total Score PHQ 2 0       There are no discontinued medications.

## 2018-04-19 NOTE — PATIENT INSTRUCTIONS

## 2018-04-19 NOTE — MR AVS SNAPSHOT
[address] [id number] [phone] Patient: Polina Flores MRN: [id number] LWT:7/74/[birth year]88 Visit Information Date & Time Provider Department Dept. Phone Encounter #  
 4/19/2018  2:00 PM Usman Bates, [address] [facility] [phone] [id number] Follow-up Instructions Return in about 6 months (around 10/19/2018). Upcoming Health Maintenance Date Due Hepatitis C Screening 1955 DTaP/Tdap/Td series (1 - Tdap) 9/25/1976 FOBT Q 1 YEAR AGE 50-75 9/25/2005 ZOSTER VACCINE AGE 60> 7/25/2015 Influenza Age 5 to Adult 8/1/2017 Allergies as of 4/19/2018  Review Complete On: 4/19/2018 By: Usman Bates MD  
  
 Severity Noted Reaction Type Reactions Influenza Virus Vaccine, Specific  01/03/2017    Other (comments) Humberto Counter Current Immunizations  Never Reviewed No immunizations on file. Not reviewed this visit You Were Diagnosed With   
  
 Codes Comments Essential hypertension    -  Primary ICD-10-CM: I10 
ICD-9-CM: 401.9 Dyslipidemia     ICD-10-CM: E78.5 ICD-9-CM: 272.4 Long-term use of high-risk medication     ICD-10-CM: Z79.899 ICD-9-CM: V58.69 Vitals BP Pulse Height(growth percentile) Weight(growth percentile) SpO2 BMI  
 102/60 (BP 1 Location: Right arm, BP Patient Position: Sitting) 76 6' (1.829 m) 179 lb (81.2 kg) 97% 24.28 kg/m2 Smoking Status Never Smoker BMI and BSA Data Body Mass Index Body Surface Area  
 24.28 kg/m 2 2.03 m 2 Preferred Pharmacy Pharmacy Name Phone [phone] [phone] Your Updated Medication List  
  
   
This list is accurate as of 4/19/18  2:19 PM.  Always use your most recent med list.  ALLEGRA PO  
 Take 1 Tab by mouth as needed. aspirin delayed-release 81 mg tablet Take 1 Tab by mouth daily. multivitamin tablet Commonly known as:  ONE A DAY Take 1 Tab by mouth daily. rosuvastatin 20 mg tablet Commonly known as:  CRESTOR  
TAKE 1 TAB BY MOUTH NIGHTLY.  
  
 valsartan 80 mg tablet Commonly known as:  DIOVAN Take 160 mg by mouth nightly. Follow-up Instructions Return in about 6 months (around 10/19/2018). Introducing [facility] [place] & [facility] SERVICES! Jannette Min introduces yuback patient portal. Now you can access parts of your medical record, email your doctor's office, and request medication refills online. 1. In your internet browser, go to [email or web address] 2. Click on the First Time User? Click Here link in the Sign In box. You will see the New Member Sign Up page. 3. Enter your yuback Access Code exactly as it appears below. You will not need to use this code after youve completed the sign-up process. If you do not sign up before the expiration date, you must request a new code. · yuback Access Code: [email or web address] Expires: 7/18/2018  1:58 PM 
 
4. Enter the last four digits of your Social Security Number (xxxx) and Date of Birth (mm/dd/yyyy) as indicated and click Submit. You will be taken to the next sign-up page. 5. Create a yuback ID. This will be your yuback login ID and cannot be changed, so think of one that is secure and easy to remember. 6. Create a yuback password. You can change your password at any time. 7. Enter your Password Reset Question and Answer. This can be used at a later time if you forget your password. 8. Enter your e-mail address. You will receive e-mail notification when new information is available in [address] 9. Click Sign Up. You can now view and download portions of your medical record. 10. Click the Download Summary menu link to download a portable copy of your medical information. If you have questions, please visit the Frequently Asked Questions section of the wikifoliot website. Remember, 4Cable TV is NOT to be used for urgent needs. For medical emergencies, dial 911. Now available from your iPhone and Android! Please provide this summary of care documentation to your next provider. Your primary care clinician is listed as ANNA Torre. If you have any questions after today's visit, please call [phone].

## 2018-08-01 ENCOUNTER — TELEPHONE (OUTPATIENT)
Dept: INTERNAL MEDICINE CLINIC | Age: 63
End: 2018-08-01

## 2018-08-07 ENCOUNTER — TELEPHONE (OUTPATIENT)
Dept: INTERNAL MEDICINE CLINIC | Age: 63
End: 2018-08-07

## 2018-08-07 RX ORDER — OLMESARTAN MEDOXOMIL 20 MG/1
20 TABLET ORAL DAILY
Qty: 30 TAB | Refills: 1 | Status: SHIPPED | OUTPATIENT
Start: 2018-08-07 | End: 2018-08-21 | Stop reason: SDUPTHER

## 2018-08-21 ENCOUNTER — OFFICE VISIT (OUTPATIENT)
Dept: INTERNAL MEDICINE CLINIC | Age: 63
End: 2018-08-21

## 2018-08-21 VITALS
OXYGEN SATURATION: 99 % | DIASTOLIC BLOOD PRESSURE: 84 MMHG | WEIGHT: 179.2 LBS | RESPIRATION RATE: 19 BRPM | HEIGHT: 72 IN | SYSTOLIC BLOOD PRESSURE: 144 MMHG | TEMPERATURE: 97.7 F | HEART RATE: 59 BPM | BODY MASS INDEX: 24.27 KG/M2

## 2018-08-21 DIAGNOSIS — I10 ESSENTIAL HYPERTENSION: Primary | ICD-10-CM

## 2018-08-21 RX ORDER — OLMESARTAN MEDOXOMIL 40 MG/1
40 TABLET ORAL DAILY
Qty: 30 TAB | Refills: 0 | Status: SHIPPED | OUTPATIENT
Start: 2018-08-21 | End: 2018-09-25 | Stop reason: SDUPTHER

## 2018-08-21 NOTE — PROGRESS NOTES
This note will not be viewable in [address] Subjective:     Naila Rose returns to the office today in follow-up of his hypertension. The Diovan which he previously had been taking was recalled and he was changed to Benicar 20 mg a day. He has been tolerating this without cough, lower extremity edema or dizziness. He has had no headaches, numbness, tingling or focal neurological problems. Past Medical History:   Diagnosis Date    Cough 9/13/2017    Dyslipidemia 9/13/2017    Eczema     to hands    Essential hypertension 9/13/2017    Guillain Barré syndrome ([address]     states due to flu shot 40 years ago    Hand eczema 9/13/2017    Herpes zoster without complications 7/84/1291    High cholesterol     History of Guillain-[place] syndrome 9/13/2017    Hx of chest tube placement 1976    Hypertension     Ill-defined condition     polypneumothroax age 24    Long-term use of high-risk medication 9/13/2017    Posterior tibial tendinitis of right leg 9/13/2017    Right ankle pain 9/13/2017    Stenosis of left carotid artery 9/13/2017    Stroke ([address] 12/21/2016    right leg and facial weakness-no deficits presently but general weakness    TIA (transient ischemic attack) 9/13/2017     Past Surgical History:   Procedure Laterality Date    HX HEENT      septoplasty    HX OTHER SURGICAL      colonoscopy    HX TONSILLECTOMY       Allergies   Allergen Reactions    Influenza Virus Vaccine, Specific Other (comments)     Malou Gutierrez     Current Outpatient Prescriptions   Medication Sig Dispense Refill    olmesartan (BENICAR) 40 mg tablet Take 1 Tab by mouth daily. 30 Tab 0    rosuvastatin (CRESTOR) 20 mg tablet TAKE 1 TAB BY MOUTH NIGHTLY. 30 Tab 12    FEXOFENADINE HCL (ALLEGRA PO) Take 1 Tab by mouth as needed.  multivitamin (ONE A DAY) tablet Take 1 Tab by mouth daily.  aspirin delayed-release 81 mg tablet Take 1 Tab by mouth daily.        Social History     Social History    Marital status:      Spouse name: N/A    Number of children: N/A    Years of education: N/A     Social History Main Topics    Smoking status: Never Smoker    Smokeless tobacco: Never Used    Alcohol use 12.0 oz/week     20 Cans of beer per week      Comment: none in 10 days    Drug use: No    Sexual activity: No     Other Topics Concern    None     Social History Narrative     Family History   Problem Relation Age of Onset    No Known Problems Mother     Diabetes Father     Hypertension Father     Cataract Father        Review of Systems:  GEN: no weight loss, weight gain, fatigue or night sweats  CV: no PND, orthopnea, or palpitations  Resp: no dyspnea on exertion, no cough  Abd: no nausea, vomiting or diarrhea  EXT: denies edema, claudication  Endocrine: no hair loss, excessive thirst or polyuria  Neurological ROS: no TIA or stroke symptoms  ROS otherwise negative      Objective:     Visit Vitals    /84    Pulse (!) 59    Temp 97.7 °F (36.5 °C) (Oral)    Resp 19    Ht 6' (1.829 m)    Wt 179 lb 3.2 oz (81.3 kg)    SpO2 99%    BMI 24.3 kg/m2     Body mass index is 24.3 kg/(m^2). General:   alert, cooperative and no distress   Eyes: conjunctivae/sclerae clear. PERRL, EOM's intact   Mouth:  No oral lesions, no pharyngeal erythema, no exudates   Neck: Trachea midline, no thyromegaly, no bruits   Heart: S1 and S2 normal,no murmurs noted    Lungs: Clear to auscultation bilaterally, no increased work of breathing   Abdomen: Soft, nontender. Normal bowel sounds   Extremities: No edema or cyanosis   Neuro: ..alert, oriented x3,speech normal in context and clarity, cranial nerves II-XII intact,motor strength: full proximally and distally,gait: normal  reflexes: full and symmetric     Physical exam otherwise negative         Assessment/Plan:     Diagnoses and all orders for this visit:    Essential hypertension    Other orders  -     olmesartan (BENICAR) 40 mg tablet; Take 1 Tab by mouth daily. , Normal, Disp-30 Tab, R-0        Other instructions:   His blood pressure is not to goal and his Benicar will be increased to 40 mg daily    No added salt diet is encouraged    Follow-up in about 5 weeks    Follow-up Disposition:  Return in about 5 weeks (around 9/25/2018).     Jb Owens MD

## 2018-08-21 NOTE — PATIENT INSTRUCTIONS
Learning About ARBs  Introduction    ARBs (angiotensin II receptor blockers) block a hormone that makes blood vessels narrow. As a result, the blood vessels relax and widen. This lowers blood pressure. ARBs also put more water and salt into the urine. This also lowers blood pressure. ARBs can treat:  · High blood pressure. · Coronary artery disease. · Heart failure. They also may be used to help your kidneys when you have diabetes. Examples  ARBs include:  · Candesartan (Atacand). · Irbesartan (Avapro). · Losartan (Cozaar). This is not a complete list of all ARBs. Possible side effects  Side effects may include:  · Low blood pressure. You may feel dizzy and weak. · Diarrhea. · High potassium levels. You may have other side effects or reactions not listed here. Check the information that comes with your medicine. What to know about taking this medicine  · ARBs may be used if you had a cough when you tried to take an ACE inhibitor. ARBs are less likely to cause a cough. · You may need regular blood tests. · Take your medicines exactly as prescribed. Call your doctor if you think you are having a problem with your medicine. · Tell your doctor or pharmacist all the medicines you take. This includes over-the-counter medicines, vitamins, herbal products, and supplements. Taking some medicines together can cause problems. · You should not take ARBs if you are pregnant or planning to become pregnant. Where can you learn more? Go to http://matthew-migel.info/. Enter K212 in the search box to learn more about \"Learning About ARBs. \"  Current as of: December 6, 2017  Content Version: 11.7  © 5662-7973 [employer]. Care instructions adapted under license by [employer] (which disclaims liability or warranty for this information).  If you have questions about a medical condition or this instruction, always ask your healthcare professional. Moi Holes disclaims any warranty or liability for your use of this information.

## 2018-08-21 NOTE — MR AVS SNAPSHOT
[facility] 
 
 
 [address] [id number] [phone] Patient: Livan Conteh MRN: [id number] TGI:4/60/4029 Visit Information Date & Time Provider Department Dept. Phone Encounter #  
 8/21/2018  8:20 AM Virgilio Kaur MD [facility] [id number] Follow-up Instructions Return in about 5 weeks (around 9/25/2018). Your Appointments 10/25/2018  8:00 AM  
Complete Physical with MD Clair Cunha 26 ([facility]) Appt Note: review [address] [phone] [address] [phone] Upcoming Health Maintenance Date Due Hepatitis C Screening 1955 DTaP/Tdap/Td series (1 - Tdap) 9/25/1976 FOBT Q 1 YEAR AGE 50-75 9/25/2005 ZOSTER VACCINE AGE 60> 7/25/2015 Influenza Age 5 to Adult 8/1/2018 Allergies as of 8/21/2018  Review Complete On: 8/21/2018 By: Virgilio Kaur MD  
  
 Severity Noted Reaction Type Reactions Influenza Virus Vaccine, Specific  01/03/2017    Other (comments) Jazmine Villarreal Current Immunizations  Never Reviewed No immunizations on file. Not reviewed this visit You Were Diagnosed With   
  
 Codes Comments Essential hypertension    -  Primary ICD-10-CM: I10 
ICD-9-CM: 401.9 Vitals BP Pulse Temp Resp Height(growth percentile) Weight(growth percentile) 144/84 (!) 59 97.7 °F (36.5 °C) (Oral) 19 6' (1.829 m) 179 lb 3.2 oz (81.3 kg) SpO2 BMI Smoking Status 99% 24.3 kg/m2 Never Smoker Vitals History BMI and BSA Data Body Mass Index Body Surface Area  
 24.3 kg/m 2 2.03 m 2 Preferred Pharmacy Pharmacy Name Phone [phone] [phone] Your Updated Medication List  
  
   
This list is accurate as of 8/21/18  8:44 AM.  Always use your most recent med list. ALLEGRA PO Take 1 Tab by mouth as needed. aspirin delayed-release 81 mg tablet Take 1 Tab by mouth daily. multivitamin tablet Commonly known as:  ONE A DAY Take 1 Tab by mouth daily. olmesartan 40 mg tablet Commonly known as:  [employer] Take 1 Tab by mouth daily. rosuvastatin 20 mg tablet Commonly known as:  CRESTOR  
TAKE 1 TAB BY MOUTH NIGHTLY. Prescriptions Sent to Pharmacy Refills  
 olmesartan (BENICAR) 40 mg tablet 0 Sig: Take 1 Tab by mouth daily. Class: Normal  
 Pharmacy: [address] #: [phone] Route: Oral  
  
Follow-up Instructions Return in about 5 weeks (around 9/25/2018). Introducing [facility]! Norberto Grey introduces [employer] patient portal. Now you can access parts of your medical record, email your doctor's office, and request medication refills online. 1. In your internet browser, go to [email or web address] 2. Click on the First Time User? Click Here link in the Sign In box. You will see the New Member Sign Up page. 3. Enter your [employer] Access Code exactly as it appears below. You will not need to use this code after youve completed the sign-up process. If you do not sign up before the expiration date, you must request a new code. · [employer] Access Code: [email or web address] Expires: 11/19/2018  8:08 AM 
 
4. Enter the last four digits of your Social Security Number (xxxx) and Date of Birth (mm/dd/yyyy) as indicated and click Submit. You will be taken to the next sign-up page. 5. Create a [employer] ID. This will be your [employer] login ID and cannot be changed, so think of one that is secure and easy to remember. 6. Create a [employer] password. You can change your password at any time. 7. Enter your Password Reset Question and Answer. This can be used at a later time if you forget your password. 8. Enter your e-mail address. You will receive e-mail notification when new information is available in [address] 9. Click Sign Up. You can now view and download portions of your medical record. 10. Click the Download Summary menu link to download a portable copy of your medical information. If you have questions, please visit the Frequently Asked Questions section of the [employer] website. Remember, [employer] is NOT to be used for urgent needs. For medical emergencies, dial 911. Now available from your iPhone and Android! Please provide this summary of care documentation to your next provider. Your primary care clinician is listed as ANNA Torre. If you have any questions after today's visit, please call [phone].

## 2018-08-21 NOTE — PROGRESS NOTES
Health Maintenance Due   Topic Date Due    Hepatitis C Screening  1955    DTaP/Tdap/Td series (1 - Tdap) 09/25/1976    FOBT Q 1 YEAR AGE 50-75  09/25/2005    ZOSTER VACCINE AGE 60>  07/25/2015    Influenza Age 9 to Adult  08/01/2018       Chief Complaint   Patient presents with    Follow Up Chronic Condition    Hypertension       1. Have you been to the ER, urgent care clinic since your last visit? Hospitalized since your last visit? No    2. Have you seen or consulted any other health care providers outside of the [address] [place] [address] since your last visit? Include any pap smears or colon screening. No    3) Do you have an Advance Directive on file? yes    4) Are you interested in receiving information on Advance Directives? NO      Patient is accompanied by self I have received verbal consent from [address] [place] [address] to discuss any/all medical information while they are present in the room.

## 2018-09-26 RX ORDER — OLMESARTAN MEDOXOMIL 40 MG/1
TABLET ORAL
Qty: 30 TAB | Refills: 0 | Status: SHIPPED | OUTPATIENT
Start: 2018-09-26 | End: 2018-09-27 | Stop reason: SDUPTHER

## 2018-09-27 ENCOUNTER — OFFICE VISIT (OUTPATIENT)
Dept: INTERNAL MEDICINE CLINIC | Age: 63
End: 2018-09-27

## 2018-09-27 VITALS
HEIGHT: 72 IN | BODY MASS INDEX: 24.11 KG/M2 | DIASTOLIC BLOOD PRESSURE: 76 MMHG | OXYGEN SATURATION: 98 % | WEIGHT: 178 LBS | HEART RATE: 62 BPM | SYSTOLIC BLOOD PRESSURE: 124 MMHG

## 2018-09-27 DIAGNOSIS — I10 ESSENTIAL HYPERTENSION: Primary | ICD-10-CM

## 2018-09-27 RX ORDER — OLMESARTAN MEDOXOMIL 40 MG/1
40 TABLET ORAL DAILY
Qty: 90 TAB | Refills: 3 | Status: SHIPPED | OUTPATIENT
Start: 2018-09-27 | End: 2019-10-06 | Stop reason: SDUPTHER

## 2018-09-27 NOTE — PATIENT INSTRUCTIONS

## 2018-09-27 NOTE — PROGRESS NOTES
Kavin Melissa is a 61 y.o. male presenting for Hypertension (5 week fu) Severinonoreen Mar 1. Have you been to the ER, urgent care clinic since your last visit? Hospitalized since your last visit? No 
 
2. Have you seen or consulted any other health care providers outside of the [address] [place] [address] since your last visit? Include any pap smears or colon screening. No 
 
No flowsheet data found. Abuse Screening Questionnaire 10/17/2017 Do you ever feel afraid of your partner? Emily Toro Are you in a relationship with someone who physically or mentally threatens you? Emily Toro Is it safe for you to go home? Y  
 
 
PHQ over the last two weeks 8/21/2018 Little interest or pleasure in doing things Not at all Feeling down, depressed, irritable, or hopeless Not at all Total Score PHQ 2 0 There are no discontinued medications.

## 2018-09-27 NOTE — PROGRESS NOTES
This note will not be viewable in [address] Subjective:  
 
Mr. Jaxson Mccauley returns to the office today in follow-up of his hypertension. He previously had been on valsartan which was recalled and he was started then on Benicar 20 mg. He was not to goal and 5 weeks ago his dosage was increased to 40 mg a day. He is tolerating the medication without any cough, lower extremity edema or orthostatic dizziness. He has had no headaches, numbness, tingling or focal neurological problems. Past Medical History:  
Diagnosis Date  Cough 9/13/2017  Dyslipidemia 9/13/2017  Eczema   
 to hands  Essential hypertension 9/13/2017  Guillain Barré syndrome ([address] states due to flu shot 40 years ago  Hand eczema 9/13/2017  Herpes zoster without complications 5/39/2390  High cholesterol  History of Guillain-[place] syndrome 9/13/2017  Hx of chest tube placement 1976  Hypertension  Ill-defined condition   
 polypneumothroax age 24  Long-term use of high-risk medication 9/13/2017  Posterior tibial tendinitis of right leg 9/13/2017  Right ankle pain 9/13/2017  Stenosis of left carotid artery 9/13/2017  Stroke ([address] 12/21/2016  
 right leg and facial weakness-no deficits presently but general weakness  TIA (transient ischemic attack) 9/13/2017 Past Surgical History:  
Procedure Laterality Date  HX HEENT    
 septoplasty  HX OTHER SURGICAL    
 colonoscopy  HX TONSILLECTOMY Allergies Allergen Reactions  Influenza Virus Vaccine, Specific Other (comments) Joselyn Brice Current Outpatient Prescriptions Medication Sig Dispense Refill  olmesartan (BENICAR) 40 mg tablet Take 1 Tab by mouth daily. 90 Tab 3  
 rosuvastatin (CRESTOR) 20 mg tablet TAKE 1 TAB BY MOUTH NIGHTLY. 30 Tab 12  
 FEXOFENADINE HCL (ALLEGRA PO) Take 1 Tab by mouth as needed.  multivitamin (ONE A DAY) tablet Take 1 Tab by mouth daily.  aspirin delayed-release 81 mg tablet Take 1 Tab by mouth daily. Social History Social History  Marital status:  Spouse name: N/A  
 Number of children: N/A  
 Years of education: N/A Social History Main Topics  Smoking status: Never Smoker  Smokeless tobacco: Never Used  Alcohol use 12.0 oz/week 20 Cans of beer per week Comment: none in 10 days  Drug use: No  
 Sexual activity: No  
 
Other Topics Concern  None Social History Narrative Family History Problem Relation Age of Onset  No Known Problems Mother  Diabetes Father  Hypertension Father  Cataract Father Review of Systems: 
GEN: no weight loss, weight gain, fatigue or night sweats CV: no PND, orthopnea, or palpitations Resp: no dyspnea on exertion, no cough Abd: no nausea, vomiting or diarrhea EXT: denies edema, claudication Endocrine: no hair loss, excessive thirst or polyuria Neurological ROS: no TIA or stroke symptoms ROS otherwise negative Objective:  
 
Visit Vitals  /76  Pulse 62  Ht 6' (1.829 m)  Wt 178 lb (80.7 kg)  SpO2 98%  BMI 24.14 kg/m2 Body mass index is 24.14 kg/(m^2). General:   alert, cooperative and no distress Eyes: conjunctivae/sclerae clear. PERRL, EOM's intact Mouth:  No oral lesions, no pharyngeal erythema, no exudates Neck: Trachea midline, no thyromegaly, no bruits Heart: S1 and S2 normal,no murmurs noted Lungs: Clear to auscultation bilaterally, no increased work of breathing Abdomen: Soft, nontender. Normal bowel sounds Extremities: No edema or cyanosis Neuro: ..alert, oriented x3,speech normal in context and clarity, cranial nerves II-XII intact,motor strength: full proximally and distally,gait: normal 
reflexes: full and symmetric Physical exam otherwise negative Assessment/Plan:  
 
Diagnoses and all orders for this visit: 
 
Essential hypertension -     olmesartan (BENICAR) 40 mg tablet; Take 1 Tab by mouth daily. , Normal, Disp-90 Tab, R-3 Other instructions: The patient's medication is reviewed and reconciled. His blood pressure is to goal and no changes will be made in his current regimen. No added salt diet is encouraged Patient declines influenza vaccination due to previous history of Guillain-Barré syndrome Follow-up 6 months Follow-up Disposition: 
Return in about 6 months (around 3/27/2019).  
 
Aislinn Ca MD

## 2018-09-27 NOTE — MR AVS SNAPSHOT
[facility] 
 
 
 [address] [address] [phone] Patient: Radha Adames MRN: [id number] GOC:1/18/8909 Visit Information Date & Time Provider Department Dept. Phone Encounter #  
 9/27/2018  8:20 AM Ashanti Miles MD 194Lisa Rocha [id number] Follow-up Instructions Return in about 6 months (around 3/27/2019). Your Appointments 10/25/2018  8:00 AM  
Complete Physical with MD Clair Parham 26 ([address]) Appt Note: review [address] [address] [address] [facility] [address] [address] 4/1/2019  8:00 AM  
FOLLOW UP 10 with MD Clair Parham 26 ([facility]) Appt Note: 6 month follow up appointment [address] [address] [address] [facility] [address] Upcoming Health Maintenance Date Due Hepatitis C Screening 1955 DTaP/Tdap/Td series (1 - Tdap) 9/25/1976 Shingrix Vaccine Age 50> (1 of 2) 9/25/2005 FOBT Q 1 YEAR AGE 50-75 9/25/2005 Allergies as of 9/27/2018  Review Complete On: 9/27/2018 By: Ashanti Miles MD  
  
 Severity Noted Reaction Type Reactions Influenza Virus Vaccine, Specific  01/03/2017    Other (comments) Will Antonio Current Immunizations  Never Reviewed No immunizations on file. Not reviewed this visit You Were Diagnosed With   
  
 Codes Comments Essential hypertension    -  Primary ICD-10-CM: I10 
ICD-9-CM: 401.9 Vitals BP Pulse Height(growth percentile) Weight(growth percentile) SpO2 BMI  
 124/76 62 6' (1.829 m) 178 lb (80.7 kg) 98% 24.14 kg/m2 Smoking Status Never Smoker Vitals History BMI and BSA Data Body Mass Index Body Surface Area  
 24.14 kg/m 2 2.02 m 2 Preferred Pharmacy Pharmacy Name Phone [facility][phone] [facility] [phone] Your Updated Medication List  
  
   
This list is accurate as of 9/27/18  8:35 AM.  Always use your most recent med list. ALLEGRA PO Take 1 Tab by mouth as needed. aspirin delayed-release 81 mg tablet Take 1 Tab by mouth daily. multivitamin tablet Commonly known as:  ONE A DAY Take 1 Tab by mouth daily. olmesartan 40 mg tablet Commonly known as:  [employer] Take 1 Tab by mouth daily. rosuvastatin 20 mg tablet Commonly known as:  CRESTOR  
TAKE 1 TAB BY MOUTH NIGHTLY. Prescriptions Sent to Pharmacy Refills  
 olmesartan (BENICAR) 40 mg tablet 3 Sig: Take 1 Tab by mouth daily. Class: Normal  
 Pharmacy: [facility] , [address] [place] [address] #: [phone] Route: Oral  
  
Follow-up Instructions Return in about 6 months (around 3/27/2019). Patient Instructions DASH Diet: Care Instructions Your Care Instructions The DASH diet is an eating plan that can help lower your blood pressure. DASH stands for Dietary Approaches to Stop Hypertension. Hypertension is high blood pressure. The DASH diet focuses on eating foods that are high in calcium, potassium, and magnesium. These nutrients can lower blood pressure. The foods that are highest in these nutrients are fruits, vegetables, low-fat dairy products, nuts, seeds, and legumes. But taking calcium, potassium, and magnesium supplements instead of eating foods that are high in those nutrients does not have the same effect. The DASH diet also includes whole grains, fish, and poultry.  
The DASH diet is one of several lifestyle changes your doctor may recommend to lower your high blood pressure. Your doctor may also want you to decrease the amount of sodium in your diet. Lowering sodium while following the DASH diet can lower blood pressure even further than just the DASH diet alone. Follow-up care is a key part of your treatment and safety. Be sure to make and go to all appointments, and call your doctor if you are having problems. It's also a good idea to know your test results and keep a list of the medicines you take. How can you care for yourself at home? Following the DASH diet · Eat 4 to 5 servings of fruit each day. A serving is 1 medium-sized piece of fruit, ½ cup chopped or canned fruit, 1/4 cup dried fruit, or 4 ounces (½ cup) of fruit juice. Choose fruit more often than fruit juice. · Eat 4 to 5 servings of vegetables each day. A serving is 1 cup of lettuce or raw leafy vegetables, ½ cup of chopped or cooked vegetables, or 4 ounces (½ cup) of vegetable juice. Choose vegetables more often than vegetable juice. · Get 2 to 3 servings of low-fat and fat-free dairy each day. A serving is 8 ounces of milk, 1 cup of yogurt, or 1 ½ ounces of cheese. · Eat 6 to 8 servings of grains each day. A serving is 1 slice of bread, 1 ounce of dry cereal, or ½ cup of cooked rice, pasta, or cooked cereal. Try to choose whole-grain products as much as possible. · Limit lean meat, poultry, and fish to 2 servings each day. A serving is 3 ounces, about the size of a deck of cards. · Eat 4 to 5 servings of nuts, seeds, and legumes (cooked dried beans, lentils, and split peas) each week. A serving is 1/3 cup of nuts, 2 tablespoons of seeds, or ½ cup of cooked beans or peas. · Limit fats and oils to 2 to 3 servings each day. A serving is 1 teaspoon of vegetable oil or 2 tablespoons of salad dressing. · Limit sweets and added sugars to 5 servings or less a week. A serving is 1 tablespoon jelly or jam, ½ cup sorbet, or 1 cup of lemonade. · Eat less than 2,300 milligrams (mg) of sodium a day. If you limit your sodium to 1,500 mg a day, you can lower your blood pressure even more. Tips for success · Start small. Do not try to make dramatic changes to your diet all at once. You might feel that you are missing out on your favorite foods and then be more likely to not follow the plan. Make small changes, and stick with them. Once those changes become habit, add a few more changes. · Try some of the following: ¨ Make it a goal to eat a fruit or vegetable at every meal and at snacks. This will make it easy to get the recommended amount of fruits and vegetables each day. ¨ Try yogurt topped with fruit and nuts for a snack or healthy dessert. ¨ Add lettuce, tomato, cucumber, and onion to sandwiches. ¨ Combine a ready-made pizza crust with low-fat mozzarella cheese and lots of vegetable toppings. Try using tomatoes, squash, spinach, broccoli, carrots, cauliflower, and onions. ¨ Have a variety of cut-up vegetables with a low-fat dip as an appetizer instead of chips and dip. ¨ Sprinkle sunflower seeds or chopped almonds over salads. Or try adding chopped walnuts or almonds to cooked vegetables. ¨ Try some vegetarian meals using beans and peas. Add garbanzo or kidney beans to salads. Make burritos and tacos with mashed phelps beans or black beans. Where can you learn more? Go to http://matthew-migel.info/. Enter E008 in the search box to learn more about \"DASH Diet: Care Instructions. \" Current as of: December 6, 2017 Content Version: 11.7 © 0581-2222 Poacht App. Care instructions adapted under license by Xand (which disclaims liability or warranty for this information). If you have questions about a medical condition or this instruction, always ask your healthcare professional. [address] any warranty or liability for your use of this information. Introducing [address]! [facility] introduces CitySourcedt patient portal. Now you can access parts of your medical record, email your doctor's office, and request medication refills online. 1. In your internet browser, go to [email or web address] 2. Click on the First Time User? Click Here link in the Sign In box. You will see the New Member Sign Up page. 3. Enter your Park.com Access Code exactly as it appears below. You will not need to use this code after youve completed the sign-up process. If you do not sign up before the expiration date, you must request a new code. · Park.com Access Code: [email or web address] Expires: 11/19/2018  8:08 AM 
 
4. Enter the last four digits of your Social Security Number (xxxx) and Date of Birth (mm/dd/yyyy) as indicated and click Submit. You will be taken to the next sign-up page. 5. Create a Park.com ID. This will be your Park.com login ID and cannot be changed, so think of one that is secure and easy to remember. 6. Create a Park.com password. You can change your password at any time. 7. Enter your Password Reset Question and Answer. This can be used at a later time if you forget your password. 8. Enter your e-mail address. You will receive e-mail notification when new information is available in [address] 9. Click Sign Up. You can now view and download portions of your medical record. 10. Click the Download Summary menu link to download a portable copy of your medical information. If you have questions, please visit the Frequently Asked Questions section of the Park.[email or web address] website. Remember, Park.com is NOT to be used for urgent needs. For medical emergencies, dial 911. Now available from your iPhone and Android! Please provide this summary of care documentation to your next provider. Your primary care clinician is listed as ANNA Steel 21. If you have any questions after today's visit, please call [phone].

## 2018-10-25 ENCOUNTER — OFFICE VISIT (OUTPATIENT)
Dept: INTERNAL MEDICINE CLINIC | Age: 63
End: 2018-10-25

## 2018-10-25 VITALS
BODY MASS INDEX: 24.03 KG/M2 | HEART RATE: 72 BPM | OXYGEN SATURATION: 99 % | DIASTOLIC BLOOD PRESSURE: 76 MMHG | HEIGHT: 72 IN | SYSTOLIC BLOOD PRESSURE: 126 MMHG | WEIGHT: 177.4 LBS

## 2018-10-25 DIAGNOSIS — I10 ESSENTIAL HYPERTENSION: ICD-10-CM

## 2018-10-25 DIAGNOSIS — R09.89 BRUIT OF RIGHT CAROTID ARTERY: ICD-10-CM

## 2018-10-25 DIAGNOSIS — Z00.00 ROUTINE PHYSICAL EXAMINATION: Primary | ICD-10-CM

## 2018-10-25 DIAGNOSIS — Z79.899 LONG-TERM USE OF HIGH-RISK MEDICATION: ICD-10-CM

## 2018-10-25 DIAGNOSIS — Z12.11 COLON CANCER SCREENING: ICD-10-CM

## 2018-10-25 DIAGNOSIS — Z11.59 NEED FOR HEPATITIS C SCREENING TEST: ICD-10-CM

## 2018-10-25 DIAGNOSIS — G45.9 TIA (TRANSIENT ISCHEMIC ATTACK): ICD-10-CM

## 2018-10-25 DIAGNOSIS — Z86.69 HISTORY OF GUILLAIN-BARRE SYNDROME: ICD-10-CM

## 2018-10-25 DIAGNOSIS — E78.5 DYSLIPIDEMIA: ICD-10-CM

## 2018-10-25 DIAGNOSIS — L30.9 HAND ECZEMA: ICD-10-CM

## 2018-10-25 LAB
BACTERIA UA POCT, BACTPOCT: NORMAL
BILIRUB UR QL STRIP: NORMAL
CASTS UA POCT: NORMAL
CLUE CELLS, CLUEPOCT: NORMAL
CRYSTALS UA POCT, CRYSPOCT: NORMAL
EPITHELIAL CELLS POCT: NORMAL
GLUCOSE UR-MCNC: NORMAL MG/DL
GRAN# POC: NORMAL K/UL (ref 2–7.8)
GRAN% POC: NORMAL % (ref 37–92)
HCT VFR BLD CALC: NORMAL % (ref 37–51)
HGB BLD-MCNC: NORMAL G/DL (ref 12–18)
KETONES P FAST UR STRIP-MCNC: NORMAL MG/DL
LY# POC: NORMAL K/UL (ref 0.6–4.1)
LY% POC: NORMAL % (ref 10–58.5)
MCH RBC QN: NORMAL PG (ref 26–32)
MCHC RBC-ENTMCNC: NORMAL G/DL (ref 30–36)
MCV RBC: NORMAL FL (ref 80–97)
MID #, POC: NORMAL K/UL (ref 0–1.8)
MID% POC: NORMAL % (ref 0.1–24)
MUCUS UA POCT, MUCPOCT: NORMAL
PH UR STRIP: NORMAL [PH] (ref 5–7)
PLATELET # BLD: NORMAL K/UL (ref 140–440)
PROT UR QL STRIP: NORMAL
RBC # BLD: NORMAL M/UL (ref 4.2–6.3)
RBC UA POCT, RBCPOCT: NORMAL
SP GR UR STRIP: NORMAL (ref 1.01–1.02)
TRICH UA POCT, TRICHPOC: NORMAL
UA UROBILINOGEN AMB POC: NORMAL (ref 0.2–1)
URINALYSIS CLARITY POC: NORMAL
URINALYSIS COLOR POC: NORMAL
URINE BLOOD POC: NORMAL
URINE CULT COMMENT, POCT: NORMAL
URINE LEUKOCYTES POC: NORMAL
URINE NITRITES POC: NORMAL
WBC # BLD: NORMAL K/UL (ref 4.1–10.9)
WBC UA POCT, WBCPOCT: NORMAL
YEAST UA POCT, YEASTPOC: NORMAL

## 2018-10-25 NOTE — PROGRESS NOTES
This note will not be viewable in [address] Subjective:  
 
Tom Gil is a 61 y.o. male presenting for annual comprehensive personal healthcare examination. Mr. Machelle Cardona is a 51-year-old  male who presents the office today for complete checkup. The patient has a history of hypertension for which he is on Benicar. He is tolerating this without dizziness, lower extremity edema, cough. He denies any headaches, numbness, tingling or focal neurological problems. He is on Crestor for hypercholesterolemia. Tolerates this without muscle soreness or GI upset. Has no history of coronary disease and denies exertional chest pains or claudication. The patient has a history of TIA secondary to the stenosis of the left carotid artery for which she is undergone a carotid endarterectomy. He continues to have close follow-ups with vascular surgery Regarding this. Patient has a history of Guillain-Barré syndrome. He cannot take influenza vaccines. He is in need of Tdap and shingles and would like to have this looked into to see if he is a candidate with Liqueo. The patient has been seen by dermatology in the past for eczema of his leg and hand eczema. He has been tried on multiple products but is been unable to get any relief of symptoms. History of present illness: This patient has multiple medical problems. These include: 
Patient Active Problem List  
Diagnosis Code  Carotid stenosis, symptomatic w/o infarct I65.29  
 History of Guillain-Max syndrome Z86.69  
 Hand eczema L30.9  Dyslipidemia E78.5  TIA (transient ischemic attack) G45.9  Stenosis of left carotid artery I65.22  
 Long-term use of high-risk medication Z79.899  Essential hypertension I10  
 Herpes zoster without complications D43.2  Right ankle pain M25.571  Cough R05  Posterior tibial tendinitis of right leg M76.821  Bruit of right carotid artery R09.89  
  
 
 Past Medical History:  
Diagnosis Date  Bruit of right carotid artery 10/25/2018  Cough 9/13/2017  Dyslipidemia 9/13/2017  Eczema   
 to hands  Essential hypertension 9/13/2017  Hand eczema 9/13/2017  Herpes zoster without complications 9/14/5634  High cholesterol  History of Guillain-Winfield syndrome 9/13/2017  Hx of chest tube placement 1976  Hypertension  Ill-defined condition   
 polypneumothroax age 24  Long-term use of high-risk medication 9/13/2017  Posterior tibial tendinitis of right leg 9/13/2017  Right ankle pain 9/13/2017  Stenosis of left carotid artery 9/13/2017  TIA (transient ischemic attack) 9/13/2017 Past Surgical History:  
Procedure Laterality Date  HX HEENT    
 septoplasty  HX OTHER SURGICAL    
 colonoscopy  HX TONSILLECTOMY Allergies Allergen Reactions  Influenza Virus Vaccine, Specific Other (comments) Stacia Page Current Outpatient Medications Medication Sig Dispense Refill  olmesartan (BENICAR) 40 mg tablet Take 1 Tab by mouth daily. 90 Tab 3  
 rosuvastatin (CRESTOR) 20 mg tablet TAKE 1 TAB BY MOUTH NIGHTLY. 30 Tab 12  
 FEXOFENADINE HCL (ALLEGRA PO) Take 1 Tab by mouth as needed.  multivitamin (ONE A DAY) tablet Take 1 Tab by mouth daily.  aspirin delayed-release 81 mg tablet Take 1 Tab by mouth daily. Social History Socioeconomic History  Marital status:  Spouse name: Not on file  Number of children: Not on file  Years of education: Not on file  Highest education level: Not on file Social Needs  Financial resource strain: Not on file  Food insecurity - worry: Not on file  Food insecurity - inability: Not on file  Transportation needs - medical: Not on file  Transportation needs - non-medical: Not on file Occupational History  Not on file Tobacco Use  Smoking status: Never Smoker  Smokeless tobacco: Never Used Substance and Sexual Activity  Alcohol use: Yes Alcohol/week: 12.0 oz Types: 20 Cans of beer per week Comment: none in 10 days  Drug use: No  
 Sexual activity: No  
Other Topics Concern  Not on file Social History Narrative  Not on file Family History Problem Relation Age of Onset  No Known Problems Mother  Diabetes Father  Hypertension Father  Cataract Father Health Maintenance Topic Date Due  
 Hepatitis C Screening  1955  DTaP/Tdap/Td series (1 - Tdap) 09/25/1976  Shingrix Vaccine Age 50> (1 of 2) 09/25/2005  FOBT Q 1 YEAR AGE 50-75  09/25/2005 Review of Systems Constitutional:  He denies fevers, weight loss, sweats, or fatigue. HEENT:  No blurred or double vision, headaches or dizziness. No difficulty with swallowing, taste, speech or smell. Respiratory:  No cough, wheezing or shortness of breath, or sputum production. Cardiac:  Denies chest pain, palpitations, unexplained indigestion, syncope, edema, PND or orthopnea. GI:  No changes in bowel habits, abdominal pain, no bloating, anorexia, nausea, vomiting or heartburn. :  He denies frequency, nocturia, stranguria, dysuria or sexual dysfunction. Extremities:  No joint pain, stiffness or swelling. Skin: Positive for chronic hand rash and right shin rash Neurological:  No numbness, tingling, burning paresthesias or loss of motor strength. No syncope, dizziness, frequent headaches or memory loss. ROS otherwise negative Objective:  
 
Vitals:  
 10/25/18 5115 10/25/18 8135 BP: 146/82 126/76 Pulse: 72 SpO2: 99% Weight: 177 lb 6.4 oz (80.5 kg) Height: 6' (1.829 m) PainSc:   0 - No pain Body mass index is 24.06 kg/m². Physical exam:  
General Appearance:  Well-developed, well-nourished, no acute distress. Vision:  Deferred to ophthalmologist.   
Hearing: HEENT: 
  Ears:  The TMs and ear canals were clear. Eyes:  The pupillary responses were normal.  Extraocular muscle function intact. Lids and conjunctiva not injected. Funduscopic exam revealed sharp disc margins. Pharynx:  Clear with teeth in good repair. No masses were noted. Neck:  Supple without thyromegaly or adenopathy. No JVD noted. There is a left carotid endarterectomy scar. A right carotid bruit is present Lungs: Clear to auscultation and percussion. Cardiac:  Regular rate and rhythm. No murmur. PMI not displaced. No gallop, rub or click. Abdomen:  Flat, soft, non-tender without palpable organomegaly or mass. No pulsatile mass was felt, and no bruit was heard. Bowel sounds were active. Extremities:  No clubbing, cyanosis or edema. Pulses:  Dorsalis pedis and posterior tibial pulses felt without difficulty. Skin: The patient has significant eczema involving the palms of the hands and the right shin Lymph Nodes:  None felt in the cervical, supraclavicular, axillary or inguinal region. Neurological:  Cranial nerves II-XII grossly intact. Motor strength 5/5. DTRs 2+ and symmetric. Station and gait normal.  
 
 
 
Assessment/Plan:  
Impressions: 
Diagnoses and all orders for this visit: 
 
Routine physical examination -     AMB POC COMPLETE CBC,AUTOMATED ENTER 
-     COLLECTION VENOUS BLOOD,VENIPUNCTURE 
-     AMB POC URINALYSIS DIP STICK AUTO W/ MICRO  
-     CK -     LIPID PANEL 
-     METABOLIC PANEL, COMPREHENSIVE 
-     PSA, DIAGNOSTIC (PROSTATE SPECIFIC AG) 
-     TSH 3RD GENERATION Essential hypertension Dyslipidemia Long-term use of high-risk medication History of Guillain-Golva syndrome TIA (transient ischemic attack) Need for hepatitis C screening test 
-     HEPATITIS C AB Colon cancer screening 
-     COLOGUARD TEST (FECAL DNA COLORECTAL CANCER SCREENING) Bruit of right carotid artery Hand eczema Other instructions: The patient's medications are reviewed and reconciled. No change in his current medical regimen is made. A no added salt, prudent diet is encouraged Health maintenance issues were reviewed. He is in need of hepatitis C screening and this will be ordered. He is overdue for follow-up colonoscopy which was greater than 10 years ago. He would like to do [place] guard testing and he is at low risk due to no family history of colon cancer or polyps. Age-appropriate vaccinations were reviewed and he is unable to take influenza due to Vernon Barré syndrome. We will check on Tdap and shingles vaccine for him. Await results of multiple labs Follow-up 6 months Follow-up Disposition: 
Return in about 6 months (around 4/25/2019).  
 
Victor Manuel Luna MD

## 2018-10-25 NOTE — PROGRESS NOTES
Maryan Smith is a 61 y.o. male presenting for Complete Physical 
. 1. Have you been to the ER, urgent care clinic since your last visit? Hospitalized since your last visit? No 
 
2. Have you seen or consulted any other health care providers outside of the [address] [place] [address] since your last visit? Include any pap smears or colon screening. No 
 
No flowsheet data found. Abuse Screening Questionnaire 10/17/2017 Do you ever feel afraid of your partner? Alex Virgen Are you in a relationship with someone who physically or mentally threatens you? Alex Virgen Is it safe for you to go home? Y  
 
 
PHQ over the last two weeks 8/21/2018 Little interest or pleasure in doing things Not at all Feeling down, depressed, irritable, or hopeless Not at all Total Score PHQ 2 0 There are no discontinued medications.

## 2018-10-25 NOTE — PATIENT INSTRUCTIONS
Colon Cancer Screening: Care Instructions Your Care Instructions Colorectal cancer occurs in the colon or rectum. That's the lower part of your digestive system. It is the second-leading cause of cancer deaths in the [place]. It often starts with small growths called polyps in the colon or rectum. Polyps are usually found with screening tests. Depending on the type of test, any polyps found may be removed during the tests. Colorectal cancer usually does not cause symptoms at first. But regular tests can help find it early, before it spreads and becomes harder to treat. Experts advise routine tests for colon cancer for people starting at age 48. And they advise people with a higher risk of colon cancer to get tested sooner. Talk with your doctor about when you should start testing. Discuss which tests you need. Follow-up care is a key part of your treatment and safety. Be sure to make and go to all appointments, and call your doctor if you are having problems. It's also a good idea to know your test results and keep a list of the medicines you take. What are the main screening tests for colon cancer? · Stool tests. These include the fecal immunochemical test (FIT) and the fecal occult blood test (FOBT). These tests check stool samples for signs of cancer. If your test is positive, you will need to have a colonoscopy. · Sigmoidoscopy. This test lets your doctor look at the lining of your rectum and the lowest part of your colon. Your doctor uses a lighted tube called a sigmoidoscope. This test can't find cancers or polyps in the upper part of your colon. In some cases, polyps that are found can be removed. But if your doctor finds polyps, you will need to have a colonoscopy to check the upper part of your colon. · Colonoscopy. This test lets your doctor look at the lining of your rectum and your entire colon.  The doctor uses a thin, flexible tool called a colonoscope. It can also be used to remove polyps or get a tissue sample (biopsy). What tests do you need? The following guidelines are for people age 48 and over who are not at high risk for colorectal cancer. You may have at least one of these tests as directed by your doctor. · Fecal immunochemical test (FIT) or fecal occult blood test (FOBT) every year · Sigmoidoscopy every 5 years · Colonoscopy every 10 years If you are age 68 to 80, you can work with your doctor to decide if screening is a good option. If you are age 80 or older, your doctor will likely advise that screening is not helpful. Talk with your doctor about when you need to be tested. And discuss which tests are right for you. Your doctor may recommend earlier or more frequent testing if you: 
· Have had colorectal cancer before. · Have had colon polyps. · Have symptoms of colorectal cancer. These include blood in your stool and changes in your bowel habits. · Have a parent, brother or sister, or child with colon polyps or colorectal cancer. · Have a bowel disease. This includes ulcerative colitis and Crohn's disease. · Have a rare polyp syndrome that runs in families, such as familial adenomatous polyposis (FAP). · Have had radiation treatments to the belly or pelvis. When should you call for help? Watch closely for changes in your health, and be sure to contact your doctor if: 
  · You have any changes in your bowel habits.  
  · You have any problems. Where can you learn more? Go to http://matthew-migel.info/. Enter M541 in the search box to learn more about \"Colon Cancer Screening: Care Instructions. \" Current as of: March 28, 2018 Content Version: 11.8 © 1516-3796 Bridge. Care instructions adapted under license by [employer] (which disclaims liability or warranty for this information).  If you have questions about a medical condition or this instruction, always ask your healthcare professional. [place] [address] any warranty or liability for your use of this information.

## 2018-10-26 LAB
ALBUMIN SERPL-MCNC: 4.4 G/DL (ref 3.6–4.8)
ALBUMIN/GLOB SERPL: 1.8 {RATIO} (ref 1.2–2.2)
ALP SERPL-CCNC: 94 IU/L (ref 39–117)
ALT SERPL-CCNC: 23 IU/L (ref 0–44)
APPEARANCE UR: CLEAR
AST SERPL-CCNC: 23 IU/L (ref 0–40)
BASOPHILS # BLD AUTO: 0 X10E3/UL (ref 0–0.2)
BASOPHILS NFR BLD AUTO: 1 %
BILIRUB SERPL-MCNC: 0.9 MG/DL (ref 0–1.2)
BILIRUB UR QL STRIP: NEGATIVE
BUN SERPL-MCNC: 8 MG/DL (ref 8–27)
BUN/CREAT SERPL: 7 (ref 10–24)
CALCIUM SERPL-MCNC: 9.6 MG/DL (ref 8.6–10.2)
CHLORIDE SERPL-SCNC: 103 MMOL/L (ref 96–106)
CHOLEST SERPL-MCNC: 126 MG/DL (ref 100–199)
CK SERPL-CCNC: 42 U/L (ref 24–204)
CO2 SERPL-SCNC: 25 MMOL/L (ref 20–29)
COLOR UR: YELLOW
CREAT SERPL-MCNC: 1.21 MG/DL (ref 0.76–1.27)
EOSINOPHIL # BLD AUTO: 0.1 X10E3/UL (ref 0–0.4)
EOSINOPHIL NFR BLD AUTO: 2 %
ERYTHROCYTE [DISTWIDTH] IN BLOOD BY AUTOMATED COUNT: 13.5 % (ref 12.3–15.4)
GLOBULIN SER CALC-MCNC: 2.4 G/DL (ref 1.5–4.5)
GLUCOSE SERPL-MCNC: 117 MG/DL (ref 65–99)
GLUCOSE UR QL: NEGATIVE
HCT VFR BLD AUTO: 45 % (ref 37.5–51)
HCV AB S/CO SERPL IA: <0.1 S/CO RATIO (ref 0–0.9)
HDLC SERPL-MCNC: 40 MG/DL
HGB BLD-MCNC: 15.6 G/DL (ref 13–17.7)
HGB UR QL STRIP: NEGATIVE
IMM GRANULOCYTES # BLD: 0 X10E3/UL (ref 0–0.1)
IMM GRANULOCYTES NFR BLD: 0 %
KETONES UR QL STRIP: NEGATIVE
LDLC SERPL CALC-MCNC: 64 MG/DL (ref 0–99)
LEUKOCYTE ESTERASE UR QL STRIP: NEGATIVE
LYMPHOCYTES # BLD AUTO: 0.9 X10E3/UL (ref 0.7–3.1)
LYMPHOCYTES NFR BLD AUTO: 17 %
MCH RBC QN AUTO: 31.4 PG (ref 26.6–33)
MCHC RBC AUTO-ENTMCNC: 34.7 G/DL (ref 31.5–35.7)
MCV RBC AUTO: 91 FL (ref 79–97)
MICRO URNS: NORMAL
MONOCYTES # BLD AUTO: 0.7 X10E3/UL (ref 0.1–0.9)
MONOCYTES NFR BLD AUTO: 14 %
NEUTROPHILS # BLD AUTO: 3.5 X10E3/UL (ref 1.4–7)
NEUTROPHILS NFR BLD AUTO: 66 %
NITRITE UR QL STRIP: NEGATIVE
PH UR STRIP: 6.5 [PH] (ref 5–7.5)
PLATELET # BLD AUTO: 218 X10E3/UL (ref 150–379)
POTASSIUM SERPL-SCNC: 5 MMOL/L (ref 3.5–5.2)
PROT SERPL-MCNC: 6.8 G/DL (ref 6–8.5)
PROT UR QL STRIP: NEGATIVE
PSA SERPL-MCNC: 1.6 NG/ML (ref 0–4)
RBC # BLD AUTO: 4.97 X10E6/UL (ref 4.14–5.8)
SODIUM SERPL-SCNC: 141 MMOL/L (ref 134–144)
SP GR UR: 1.01 (ref 1–1.03)
TRIGL SERPL-MCNC: 110 MG/DL (ref 0–149)
TSH SERPL DL<=0.005 MIU/L-ACNC: 1.15 UIU/ML (ref 0.45–4.5)
UROBILINOGEN UR STRIP-MCNC: 0.2 MG/DL (ref 0.2–1)
VLDLC SERPL CALC-MCNC: 22 MG/DL (ref 5–40)
WBC # BLD AUTO: 5.4 X10E3/UL (ref 3.4–10.8)

## 2018-11-14 ENCOUNTER — TELEPHONE (OUTPATIENT)
Dept: INTERNAL MEDICINE CLINIC | Age: 63
End: 2018-11-14

## 2018-11-14 LAB — COLOGUARD TEST, EXTERNAL: NEGATIVE

## 2019-02-13 RX ORDER — ROSUVASTATIN CALCIUM 20 MG/1
TABLET, COATED ORAL
Qty: 30 TAB | Refills: 12 | Status: SHIPPED | OUTPATIENT
Start: 2019-02-13 | End: 2019-12-18

## 2019-04-01 ENCOUNTER — OFFICE VISIT (OUTPATIENT)
Dept: INTERNAL MEDICINE CLINIC | Age: 64
End: 2019-04-01

## 2019-04-01 VITALS
BODY MASS INDEX: 24.98 KG/M2 | SYSTOLIC BLOOD PRESSURE: 122 MMHG | HEIGHT: 72 IN | OXYGEN SATURATION: 99 % | HEART RATE: 66 BPM | RESPIRATION RATE: 18 BRPM | DIASTOLIC BLOOD PRESSURE: 76 MMHG | WEIGHT: 184.4 LBS | TEMPERATURE: 97.9 F

## 2019-04-01 DIAGNOSIS — I10 ESSENTIAL HYPERTENSION: Primary | Chronic | ICD-10-CM

## 2019-04-01 DIAGNOSIS — Z79.899 LONG-TERM USE OF HIGH-RISK MEDICATION: Chronic | ICD-10-CM

## 2019-04-01 DIAGNOSIS — I65.22 STENOSIS OF LEFT CAROTID ARTERY: Chronic | ICD-10-CM

## 2019-04-01 DIAGNOSIS — G45.9 TIA (TRANSIENT ISCHEMIC ATTACK): Chronic | ICD-10-CM

## 2019-04-01 DIAGNOSIS — E78.5 DYSLIPIDEMIA: Chronic | ICD-10-CM

## 2019-04-01 NOTE — PATIENT INSTRUCTIONS

## 2019-04-01 NOTE — PROGRESS NOTES
Roman Witt is a 61 y.o. male presenting for Hypertension (6 mo fu) Richelle Mcintyre 1. Have you been to the ER, urgent care clinic since your last visit? Hospitalized since your last visit? No 
 
2. Have you seen or consulted any other health care providers outside of the [address] [place] [address] since your last visit? Include any pap smears or colon screening. No 
 
No flowsheet data found. Abuse Screening Questionnaire 4/1/2019 Do you ever feel afraid of your partner? Isabelle Hayden Are you in a relationship with someone who physically or mentally threatens you? Isabelle Hayden Is it safe for you to go home? Y  
 
 
3 most recent PHQ Screens 4/1/2019 Little interest or pleasure in doing things Not at all Feeling down, depressed, irritable, or hopeless Not at all Total Score PHQ 2 0 There are no discontinued medications.

## 2019-04-01 NOTE — PROGRESS NOTES
This note will not be viewable in [address] Missael Rowell is a 61 y.o. male and presents with Hypertension (6 mo fu) Segun Nogueira Subjective: 
Mr. Sydni Bryant resents to the office today in follow-up of multiple medical problems. The patient has hypertension which is been well controlled on olmesartan. He tolerates this without cough, swelling, rash, dizziness. He has had no headaches, numbness, tingling or focal neurological problems. He is on Crestor for hypercholesterolemia. He tolerates this without muscle soreness or GI upset. The patient's last LDL was below 70. He has had a previous TIA related to left carotid artery stenosis. He is status post left carotid endarterectomy. He remains on an aspirin daily. He is followed by his vascular surgeon every August and has carotid Dopplers done at that time. He has had no strokelike symptoms. Past Medical History:  
Diagnosis Date  Bruit of right carotid artery 10/25/2018  Cough 9/13/2017  Dyslipidemia 9/13/2017  Eczema   
 to hands  Essential hypertension 9/13/2017  Hand eczema 9/13/2017  Herpes zoster without complications 8/43/3520  High cholesterol  History of Guillain-[place] syndrome 9/13/2017  Hx of chest tube placement 1976  Hypertension  Ill-defined condition   
 polypneumothroax age 24  Long-term use of high-risk medication 9/13/2017  Posterior tibial tendinitis of right leg 9/13/2017  Right ankle pain 9/13/2017  Stenosis of left carotid artery 9/13/2017  TIA (transient ischemic attack) 9/13/2017 Past Surgical History:  
Procedure Laterality Date  HX HEENT    
 septoplasty  HX OTHER SURGICAL    
 colonoscopy  HX TONSILLECTOMY Allergies Allergen Reactions  Influenza Virus Vaccine, Specific Other (comments) Helena Brooks Current Outpatient Medications Medication Sig Dispense Refill  rosuvastatin (CRESTOR) 20 mg tablet TAKE 1 TAB BY MOUTH NIGHTLY. 30 Tab 12  olmesartan (BENICAR) 40 mg tablet Take 1 Tab by mouth daily. 90 Tab 3  FEXOFENADINE HCL (ALLEGRA PO) Take 1 Tab by mouth as needed.  multivitamin (ONE A DAY) tablet Take 1 Tab by mouth daily.  aspirin delayed-release 81 mg tablet Take 1 Tab by mouth daily. Social History Socioeconomic History  Marital status:  Spouse name: Not on file  Number of children: Not on file  Years of education: Not on file  Highest education level: Not on file Tobacco Use  Smoking status: Never Smoker  Smokeless tobacco: Never Used Substance and Sexual Activity  Alcohol use: Yes Alcohol/week: 12.0 oz Types: 20 Cans of beer per week Comment: none in 10 days  Drug use: No  
 Sexual activity: Never Family History Problem Relation Age of Onset  No Known Problems Mother  Diabetes Father  Hypertension Father  Cataract Father Health Maintenance Topic Date Due  
 DTaP/Tdap/Td series (1 - Tdap) 09/25/1976  Shingrix Vaccine Age 50> (1 of 2) 09/25/2005  Influenza Age 5 to Adult  08/01/2019  Cologuard Q 3 Years  11/06/2021  Hepatitis C Screening  Completed  Pneumococcal 0-64 years  Aged Out Review of Systems Constitutional: negative for fevers, chills, anorexia and weight loss Eyes:   negative for visual disturbance and irritation ENT:   negative for tinnitus,sore throat,nasal congestion,ear pain,hoarseness Respiratory:  negative for cough, hemoptysis, dyspnea,wheezing CV:   negative for chest pain, palpitations, lower extremity edema GI:   negative for nausea, vomiting, diarrhea, abdominal pain,melena Endo:               negative for polyuria,polydipsia,polyphagia,heat intolerance Genitourinary: negative for frequency, dysuria and hematuria Integumentary: negative for rash and pruritus Hematologic:  negative for easy bruising and gum/nose bleeding Musculoskel: negative for myalgias, arthralgias, back pain, muscle weakness, joint pain Neurological:  negative for headaches, dizziness, vertigo, memory problems and gait Behavl/Psych: negative for feelings of anxiety, depression, mood changes ROS otherwise negative Objective: 
Visit Vitals /76 (BP 1 Location: Right arm, BP Patient Position: Sitting) Pulse 66 Temp 97.9 °F (36.6 °C) (Oral) Resp 18 Ht 6' (1.829 m) Wt 184 lb 6.4 oz (83.6 kg) SpO2 99% BMI 25.01 kg/m² Body mass index is 25.01 kg/m². Physical Exam:  
General appearance - alert, well appearing, and in no distress Mental status - alert, oriented to person, place, and time EYE-GAURAV, EOMI,conjunctiva normal bilaterally, lids normal 
ENT-ENT exam normal, no neck nodes or sinus tenderness Nose - normal and patent, no erythema,  Or discharge Mouth - mucous membranes moist, pharynx normal without lesions Neck - supple, no significant adenopathy or bruit Chest - clear to auscultation, no wheezes, rales or rhonchi. Heart - normal rate, regular rhythm, normal S1, S2, no murmurs, rubs, clicks or gallops Abdomen - soft, nontender, nondistended, no masses or organomegaly Lymph- no adenopathy palpable Ext-peripheral pulses normal, no pedal edema, no clubbing or cyanosis Skin-Warm and dry. no hyperpigmentation, vitiligo, or suspicious lesions Neuro -alert, oriented, normal speech, no focal findings or movement disorder noted Assessment/Plan: 
Diagnoses and all orders for this visit: 
 
Essential hypertension Dyslipidemia Long-term use of high-risk medication Stenosis of left carotid artery TIA (transient ischemic attack) Other instructions:  
Patient's medications were reviewed and reconciled. No change in his current medical regimen is made. Body mass index was 25.01 and dietary counseling along with printed patient education is given Labs from 10/25 were reviewed with the patient today Follow-up 6 months Follow-up and Dispositions · Return in about 6 months (around 10/1/2019). I have reviewed with the patient details of the assessment and plan and all questions were answered. Relevent patient education was performed. The most recent lab findings were reviewed with the patient. An After Visit Summary was printed and given to the patient.  
 
Drake Loco MD

## 2019-10-06 DIAGNOSIS — I10 ESSENTIAL HYPERTENSION: ICD-10-CM

## 2019-10-06 RX ORDER — OLMESARTAN MEDOXOMIL 40 MG/1
TABLET ORAL
Qty: 90 TAB | Refills: 3 | Status: SHIPPED | OUTPATIENT
Start: 2019-10-06 | End: 2020-09-29

## 2019-10-14 ENCOUNTER — OFFICE VISIT (OUTPATIENT)
Dept: INTERNAL MEDICINE CLINIC | Age: 64
End: 2019-10-14

## 2019-10-14 VITALS
HEART RATE: 87 BPM | BODY MASS INDEX: 24.73 KG/M2 | DIASTOLIC BLOOD PRESSURE: 82 MMHG | RESPIRATION RATE: 14 BRPM | SYSTOLIC BLOOD PRESSURE: 138 MMHG | HEIGHT: 72 IN | OXYGEN SATURATION: 100 % | TEMPERATURE: 97.7 F | WEIGHT: 182.6 LBS

## 2019-10-14 DIAGNOSIS — E78.5 DYSLIPIDEMIA: Chronic | ICD-10-CM

## 2019-10-14 DIAGNOSIS — I10 ESSENTIAL HYPERTENSION: Primary | Chronic | ICD-10-CM

## 2019-10-14 DIAGNOSIS — G45.9 TIA (TRANSIENT ISCHEMIC ATTACK): Chronic | ICD-10-CM

## 2019-10-14 DIAGNOSIS — R35.0 URINE FREQUENCY: ICD-10-CM

## 2019-10-14 DIAGNOSIS — I65.22 STENOSIS OF LEFT CAROTID ARTERY: Chronic | ICD-10-CM

## 2019-10-14 DIAGNOSIS — Z79.899 LONG-TERM USE OF HIGH-RISK MEDICATION: Chronic | ICD-10-CM

## 2019-10-14 NOTE — PROGRESS NOTES
Chastity Gomez is a 59 y.o. male presenting for Hypertension (6 mo fu)  . 1. Have you been to the ER, urgent care clinic since your last visit? Hospitalized since your last visit? No    2. Have you seen or consulted any other health care providers outside of the [address] [place] [address] since your last visit? Include any pap smears or colon screening. No    No flowsheet data found. Abuse Screening Questionnaire 4/1/2019   Do you ever feel afraid of your partner? N   Are you in a relationship with someone who physically or mentally threatens you? N   Is it safe for you to go home? Y       3 most recent PHQ Screens 4/1/2019   Little interest or pleasure in doing things Not at all   Feeling down, depressed, irritable, or hopeless Not at all   Total Score PHQ 2 0       There are no discontinued medications.

## 2019-10-14 NOTE — PROGRESS NOTES
This note will not be viewable in [address] Nadege Alexis is a 59 y.o. male and presents with No chief complaint on file. .    Subjective:  Mr. Wes Lund returns to the office today in follow-up of multiple medical problems. His blood pressure is currently managed on Benicar. He is tolerating this without cough, lower extremity edema or orthostatic dizziness. He denies headaches, numbness, tingling or focal neurological problems. His hypercholesterolemia is managed on Crestor therapy. He denies muscle soreness or GI upset. He has a history of carotid artery disease for which she has had a left carotid endarterectomy and does have right carotid artery plaque. He has been seen by vascular surgery recently and had recent carotid Dopplers which were stable. He has had no TIA symptoms and does remain on aspirin therapy.     Past Medical History:   Diagnosis Date    Bruit of right carotid artery 10/25/2018    Cough 9/13/2017    Dyslipidemia 9/13/2017    Eczema     to hands    Essential hypertension 9/13/2017    Hand eczema 9/13/2017    Herpes zoster without complications 7/02/6962    High cholesterol     History of Guillain-[place] syndrome 9/13/2017    Hx of chest tube placement 1976    Hypertension     Ill-defined condition     polypneumothroax age 24    Long-term use of high-risk medication 9/13/2017    Posterior tibial tendinitis of right leg 9/13/2017    Right ankle pain 9/13/2017    Stenosis of left carotid artery 9/13/2017    TIA (transient ischemic attack) 9/13/2017     Past Surgical History:   Procedure Laterality Date    HX HEENT      septoplasty    HX OTHER SURGICAL      colonoscopy    HX TONSILLECTOMY       Allergies   Allergen Reactions    Influenza Virus Vaccine, Specific Other (comments)     Malou Gutierrez     Current Outpatient Medications   Medication Sig Dispense Refill    olmesartan (BENICAR) 40 mg tablet TAKE 1 TABLET BY MOUTH EVERY DAY 90 Tab 3    rosuvastatin (CRESTOR) 20 mg tablet TAKE 1 TAB BY MOUTH NIGHTLY. 30 Tab 12    FEXOFENADINE HCL (ALLEGRA PO) Take 1 Tab by mouth as needed.  multivitamin (ONE A DAY) tablet Take 1 Tab by mouth daily.  aspirin delayed-release 81 mg tablet Take 1 Tab by mouth daily. Social History     Socioeconomic History    Marital status:      Spouse name: Not on file    Number of children: Not on file    Years of education: Not on file    Highest education level: Not on file   Tobacco Use    Smoking status: Never Smoker    Smokeless tobacco: Never Used   Substance and Sexual Activity    Alcohol use:  Yes     Alcohol/week: 20.0 standard drinks     Types: 20 Cans of beer per week     Comment: none in 10 days    Drug use: No    Sexual activity: Never     Family History   Problem Relation Age of Onset    No Known Problems Mother     Diabetes Father     Hypertension Father     Cataract Father        Health Maintenance   Topic Date Due    DTaP/Tdap/Td series (1 - Tdap) 09/25/1976    Shingrix Vaccine Age 50> (1 of 2) 09/25/2005    Influenza Age 5 to Adult  08/01/2019    Cologuard Q 3 Years  11/06/2021    Hepatitis C Screening  Completed    Pneumococcal 0-64 years  Aged Out        Review of Systems  Constitutional: negative for fevers, chills, anorexia and weight loss  Eyes:   negative for visual disturbance and irritation  ENT:   negative for tinnitus,sore throat,nasal congestion,ear pain,hoarseness  Respiratory:  negative for cough, hemoptysis, dyspnea,wheezing  CV:   negative for chest pain, palpitations, lower extremity edema  GI:   negative for nausea, vomiting, diarrhea, abdominal pain,melena  Endo:               negative for polyuria,polydipsia,polyphagia,heat intolerance  Genitourinary: negative for frequency, dysuria and hematuria  Integumentary: negative for rash and pruritus  Hematologic:  negative for easy bruising and gum/nose bleeding  Musculoskel: negative for myalgias, arthralgias, back pain, muscle weakness, joint pain  Neurological:  negative for headaches, dizziness, vertigo, memory problems and gait   Behavl/Psych: negative for feelings of anxiety, depression, mood changes  ROS otherwise negative      Objective: There were no vitals taken for this visit. There is no height or weight on file to calculate BMI. Physical Exam:   General appearance - alert, well appearing, and in no distress  Mental status - alert, oriented to person, place, and time  EYE-GAURAV, EOMI,conjunctiva normal bilaterally, lids normal  ENT-ENT exam normal, no neck nodes or sinus tenderness  Nose - normal and patent, no erythema,  Or discharge   Mouth - mucous membranes moist, pharynx normal without lesions  Neck - supple, no significant adenopathy or bruit  Chest - clear to auscultation, no wheezes, rales or rhonchi. Heart - normal rate, regular rhythm, normal S1, S2, no murmurs, rubs, clicks or gallops   Abdomen - soft, nontender, nondistended, no masses or organomegaly  Lymph- no adenopathy palpable  Ext-peripheral pulses normal, no pedal edema, no clubbing or cyanosis  Skin-Warm and dry. no hyperpigmentation, vitiligo, or suspicious lesions  Neuro -alert, oriented, normal speech, no focal findings or movement disorder noted      Assessment/Plan:  Diagnoses and all orders for this visit:    Essential hypertension    Dyslipidemia    Long-term use of high-risk medication    TIA (transient ischemic attack)    Stenosis of left carotid artery        Other instructions: The patient's medications were reviewed and reconciled. No change in his current medical regimen is made. He no added salt, prudent diet is encouraged    Continued follow-up with vascular surgery regarding his carotid artery disease    Await results of multiple labs    Follow-up 6 months    Follow-up and Dispositions    · Return in about 6 months (around 4/14/2020).          I have reviewed with the patient details of the assessment and plan and all questions were answered. Relevent patient education was performed. The most recent lab findings were reviewed with the patient. An After Visit Summary was printed and given to the patient.     Elissa Tracey MD

## 2019-10-14 NOTE — PATIENT INSTRUCTIONS

## 2019-10-15 LAB
ALBUMIN SERPL-MCNC: 4.7 G/DL (ref 3.6–4.8)
ALBUMIN/GLOB SERPL: 2 {RATIO} (ref 1.2–2.2)
ALP SERPL-CCNC: 88 IU/L (ref 39–117)
ALT SERPL-CCNC: 26 IU/L (ref 0–44)
APPEARANCE UR: CLEAR
AST SERPL-CCNC: 22 IU/L (ref 0–40)
BASOPHILS # BLD AUTO: 0.1 X10E3/UL (ref 0–0.2)
BASOPHILS NFR BLD AUTO: 1 %
BILIRUB SERPL-MCNC: 0.9 MG/DL (ref 0–1.2)
BILIRUB UR QL STRIP: NEGATIVE
BUN SERPL-MCNC: 10 MG/DL (ref 8–27)
BUN/CREAT SERPL: 9 (ref 10–24)
CALCIUM SERPL-MCNC: 9.8 MG/DL (ref 8.6–10.2)
CHLORIDE SERPL-SCNC: 104 MMOL/L (ref 96–106)
CHOLEST SERPL-MCNC: 136 MG/DL (ref 100–199)
CK SERPL-CCNC: 49 U/L (ref 24–204)
CO2 SERPL-SCNC: 23 MMOL/L (ref 20–29)
COLOR UR: YELLOW
CREAT SERPL-MCNC: 1.11 MG/DL (ref 0.76–1.27)
EOSINOPHIL # BLD AUTO: 0.1 X10E3/UL (ref 0–0.4)
EOSINOPHIL NFR BLD AUTO: 2 %
ERYTHROCYTE [DISTWIDTH] IN BLOOD BY AUTOMATED COUNT: 12.5 % (ref 12.3–15.4)
GLOBULIN SER CALC-MCNC: 2.4 G/DL (ref 1.5–4.5)
GLUCOSE SERPL-MCNC: 121 MG/DL (ref 65–99)
GLUCOSE UR QL: NEGATIVE
HCT VFR BLD AUTO: 45.1 % (ref 37.5–51)
HDLC SERPL-MCNC: 40 MG/DL
HGB BLD-MCNC: 15.8 G/DL (ref 13–17.7)
HGB UR QL STRIP: NEGATIVE
IMM GRANULOCYTES # BLD AUTO: 0 X10E3/UL (ref 0–0.1)
IMM GRANULOCYTES NFR BLD AUTO: 0 %
KETONES UR QL STRIP: NEGATIVE
LDLC SERPL CALC-MCNC: 69 MG/DL (ref 0–99)
LEUKOCYTE ESTERASE UR QL STRIP: NEGATIVE
LYMPHOCYTES # BLD AUTO: 1.1 X10E3/UL (ref 0.7–3.1)
LYMPHOCYTES NFR BLD AUTO: 19 %
MCH RBC QN AUTO: 31 PG (ref 26.6–33)
MCHC RBC AUTO-ENTMCNC: 35 G/DL (ref 31.5–35.7)
MCV RBC AUTO: 89 FL (ref 79–97)
MICRO URNS: NORMAL
MONOCYTES # BLD AUTO: 0.8 X10E3/UL (ref 0.1–0.9)
MONOCYTES NFR BLD AUTO: 13 %
NEUTROPHILS # BLD AUTO: 3.9 X10E3/UL (ref 1.4–7)
NEUTROPHILS NFR BLD AUTO: 65 %
NITRITE UR QL STRIP: NEGATIVE
PH UR STRIP: 5.5 [PH] (ref 5–7.5)
PLATELET # BLD AUTO: 225 X10E3/UL (ref 150–450)
POTASSIUM SERPL-SCNC: 4.6 MMOL/L (ref 3.5–5.2)
PROT SERPL-MCNC: 7.1 G/DL (ref 6–8.5)
PROT UR QL STRIP: NEGATIVE
PSA SERPL-MCNC: 1.8 NG/ML (ref 0–4)
RBC # BLD AUTO: 5.09 X10E6/UL (ref 4.14–5.8)
SODIUM SERPL-SCNC: 141 MMOL/L (ref 134–144)
SP GR UR: 1.01 (ref 1–1.03)
TRIGL SERPL-MCNC: 135 MG/DL (ref 0–149)
TSH SERPL DL<=0.005 MIU/L-ACNC: 0.86 UIU/ML (ref 0.45–4.5)
UROBILINOGEN UR STRIP-MCNC: 0.2 MG/DL (ref 0.2–1)
VLDLC SERPL CALC-MCNC: 27 MG/DL (ref 5–40)
WBC # BLD AUTO: 6 X10E3/UL (ref 3.4–10.8)

## 2019-11-06 NOTE — PROGRESS NOTES
Gopi Mckinney Patient Age: 57 year old  MESSAGE:   Fax received from [facility] pharmacy:  Alternative requested:  Original rx : one touch ultrasoft lancets- use to check BG 4 x daily.  Preferred : accu check softclix.     WEIGHT AND HEIGHT:   Wt Readings from Last 1 Encounters:   10/23/19 117.9 kg (260 lb)     Ht Readings from Last 1 Encounters:   10/23/19 6' 4\" (1.93 m)     BMI Readings from Last 1 Encounters:   10/23/19 31.65 kg/m²       ALLERGIES:  Adhesive   (environmental); Casein; Ciprofloxacin; Levaquin; Milk    (food or med); Sulfa antibiotics; and Heparin  Current Outpatient Medications   Medication   • insulin aspart (NOVOLOG) 100 UNIT/ML injectable solution   • Lancets (ONETOUCH ULTRASOFT) Misc   • blood glucose (ONE TOUCH ULTRA TEST) test strip   • busPIRone (BUSPAR) 15 MG tablet   • Insulin Syringe 30G X 5/16\" 1 ML Misc   • insulin glargine (LANTUS) 100 UNIT/ML vial solution   • atorvastatin (LIPITOR) 20 MG tablet   • Blood Glucose Monitoring Suppl (ONE TOUCH ULTRA 2) w/Device Kit   • busPIRone (BUSPAR) 30 MG tablet   • famotidine (PEPCID) 20 MG tablet   • HYDROcodone-acetaminophen (NORCO)  MG per tablet   • HYDROmorphone HCl ER (EXALGO) 16 MG 24 hr tablet   • hydrOXYzine (ATARAX) 50 MG tablet   • Insulin Pen Needle (PEN NEEDLES 3/16\") 31G X 5 MM Misc   • lidocaine (XYLOCAINE) 5 % ointment   • linaclotide (LINZESS) 145 MCG capsule   • pregabalin (LYRICA) 100 MG capsule   • ondansetron (ZOFRAN ODT) 4 MG disintegrating tablet   • oxymorphone (OPANA) 10 MG tablet   • albuterol (PROAIR HFA) 108 (90 Base) MCG/ACT inhaler   • rosuvastatin (CRESTOR) 10 MG tablet   • tiZANidine (ZANAFLEX) 2 MG tablet   • naloxone (NARCAN) 4 MG/0.1ML nasal spray     No current facility-administered medications for this visit.      PHARMACY to use:           Pharmacy preference(s) on file:   [id number] - [place], [place] - [address] [facility]  [address] [facility] [address]  Phone: [phone] Fax:  This note will not be viewable in [address] Edilia Gaucher is a 58 y.o. male and presents with Hypertension (6 mo fu)  . Subjective:  Mr. Morse Skiff presents to the office today in follow-up of a number of medical issues. The patient has a dyslipidemia for which he remains on Crestor therapy. He denies muscle soreness or GI upset. Patient has had previous carotids surgery due to stenosis of the left carotid and previous TIA. Patient denies any chest pains or claudication. His blood pressure is currently managed on valsartan. This is been controlling his blood pressure well without dizziness or lower extremity edema. He has been following a no added salt diet. The patient complains of fatigue. It is been somewhat chronic. He notes that 3 or 4 days out of the week he will come home exhausted and fall asleep. His wife notes that he snores loudly at night especially if he is on his back but she has never witnessed apneic spells. The patient denies heat or cold intolerance, changes in skin or hair, his weight has been stable. He has not noted any blood in his stools.     Past Medical History:   Diagnosis Date    Cough 9/13/2017    Dyslipidemia 9/13/2017    Eczema     to hands    Essential hypertension 9/13/2017    Guillain Barré syndrome ([address]     states due to flu shot 40 years ago    Hand eczema 9/13/2017    Herpes zoster without complications 4/98/6217    High cholesterol     History of Guillain-[place] syndrome 9/13/2017    Hx of chest tube placement 1976    Hypertension     Ill-defined condition     polypneumothroax age 24    Long-term use of high-risk medication 9/13/2017    Posterior tibial tendinitis of right leg 9/13/2017    Right ankle pain 9/13/2017    Stenosis of left carotid artery 9/13/2017    Stroke ([address] 12/21/2016    right leg and facial weakness-no deficits presently but general weakness    TIA (transient ischemic attack) 9/13/2017     Past Surgical History: [phone]      CALL BACK INFO:   ROUTING:        PCP: Other Other         INS: Payor: [employer] [place] / Plan: PPO [id number] / Product Type: PPO MISC   PATIENT ADDRESS:  [address] [place] [address] [address] [place] [address]    Procedure Laterality Date    HX HEENT      septoplasty    HX OTHER SURGICAL      colonoscopy    HX TONSILLECTOMY       Allergies   Allergen Reactions    Influenza Virus Vaccine, Specific Other (comments)     Malou Gutierrez     Current Outpatient Prescriptions   Medication Sig Dispense Refill    rosuvastatin (CRESTOR) 20 mg tablet Take 1 Tab by mouth nightly. 30 Tab 6    valsartan (DIOVAN) 80 mg tablet Take 160 mg by mouth nightly.  FEXOFENADINE HCL (ALLEGRA PO) Take 1 Tab by mouth every morning.  multivitamin (ONE A DAY) tablet Take 1 Tab by mouth daily.  aspirin delayed-release 81 mg tablet Take 1 Tab by mouth daily. Social History     Social History    Marital status:      Spouse name: N/A    Number of children: N/A    Years of education: N/A     Social History Main Topics    Smoking status: Never Smoker    Smokeless tobacco: Never Used    Alcohol use 12.0 oz/week     20 Cans of beer per week      Comment: none in 10 days    Drug use: No    Sexual activity: Not Asked     Other Topics Concern    None     Social History Narrative     Family History   Problem Relation Age of Onset    No Known Problems Mother     Diabetes Father     Hypertension Father     Cataract Father        Health Maintenance   Topic Date Due    Hepatitis C Screening  1955    DTaP/Tdap/Td series (1 - Tdap) 09/25/1976    FOBT Q 1 YEAR AGE 50-75  09/25/2005    ZOSTER VACCINE AGE 60>  07/25/2015    INFLUENZA AGE 9 TO ADULT  08/01/2017        Review of Systems  Constitutional: negative for fevers, chills, anorexia and weight loss.   Positive for fatigue  Eyes:   negative for visual disturbance and irritation  ENT:   negative for tinnitus,sore throat,nasal congestion,ear pain,hoarseness  Respiratory:  negative for cough, hemoptysis, dyspnea,wheezing  CV:   negative for chest pain, palpitations, lower extremity edema  GI:   negative for nausea, vomiting, diarrhea, abdominal pain,melena  Endo: negative for polyuria,polydipsia,polyphagia,heat intolerance  Genitourinary: negative for frequency, dysuria and hematuria  Integumentary: negative for rash and pruritus  Hematologic:  negative for easy bruising and gum/nose bleeding  Musculoskel: negative for myalgias, arthralgias, back pain, muscle weakness, joint pain  Neurological:  negative for headaches, dizziness, vertigo, memory problems and gait   Behavl/Psych: negative for feelings of anxiety, depression, mood changes  ROS otherwise negative      Objective:  Visit Vitals    /72 (BP 1 Location: Right arm, BP Patient Position: Sitting)    Pulse 72    Ht 6' (1.829 m)    Wt 183 lb 12.8 oz (83.4 kg)    BMI 24.93 kg/m2     Body mass index is 24.93 kg/(m^2). Physical Exam:   General appearance - alert, well appearing, and in no distress  Mental status - alert, oriented to person, place, and time  EYE-GAURAV, EOMI,conjunctiva normal bilaterally, lids normal  ENT-ENT exam normal, no neck nodes or sinus tenderness  Nose - normal and patent, no erythema,  Or discharge   Mouth - mucous membranes moist, pharynx normal without lesions  Neck - supple, no significant adenopathy or bruit  Chest - clear to auscultation, no wheezes, rales or rhonchi. Heart - normal rate, regular rhythm, normal S1, S2, no murmurs, rubs, clicks or gallops   Abdomen - soft, nontender, nondistended, no masses or organomegaly  Lymph- no adenopathy palpable  Ext-peripheral pulses normal, no pedal edema, no clubbing or cyanosis  Skin-Warm and dry.  no hyperpigmentation, vitiligo, or suspicious lesions  Neuro -alert, oriented, normal speech, no focal findings or movement disorder noted      Assessment/Plan:  Diagnoses and all orders for this visit:    Essential hypertension  -     CBC WITH AUTOMATED DIFF  -     METABOLIC PANEL, COMPREHENSIVE  -     HEMOGLOBIN A1C WITH EAG  -     URINALYSIS W/ RFLX MICROSCOPIC    Dyslipidemia  -     LIPID PANEL  -     HEMOGLOBIN A1C WITH EAG  -     TSH 3RD GENERATION  -     T4, FREE    Long-term use of high-risk medication  -     CK    Transient cerebral ischemia, unspecified type    Chronic fatigue        Other instructions: The patient's medications are reviewed and reconciled and no change in his current medical regimen is made. A no added salt, prudent diet and exercise regimen is encouraged. Await results of multiple labs. If labs are negative consider sleep lab evaluation for evaluation of his fatigue. Influenza vaccine declined due to history of Guillain-Barré syndrome after receiving flu vaccine in the 1970s. Follow-up 6 months    Follow-up Disposition:  Return in about 6 months (around 4/17/2018). I have reviewed with the patient details of the assessment and plan and all questions were answered. Relevent patient education was performed. The most recent lab findings were reviewed with the patient. An After Visit Summary was printed and given to the patient.     Theron Dutton MD

## 2020-06-15 ENCOUNTER — OFFICE VISIT (OUTPATIENT)
Dept: INTERNAL MEDICINE CLINIC | Age: 65
End: 2020-06-15

## 2020-06-15 VITALS
DIASTOLIC BLOOD PRESSURE: 68 MMHG | TEMPERATURE: 96.9 F | BODY MASS INDEX: 24.52 KG/M2 | RESPIRATION RATE: 16 BRPM | HEIGHT: 72 IN | SYSTOLIC BLOOD PRESSURE: 122 MMHG | HEART RATE: 79 BPM | OXYGEN SATURATION: 99 % | WEIGHT: 181 LBS

## 2020-06-15 DIAGNOSIS — G45.9 TIA (TRANSIENT ISCHEMIC ATTACK): Chronic | ICD-10-CM

## 2020-06-15 DIAGNOSIS — Z79.899 LONG-TERM USE OF HIGH-RISK MEDICATION: Chronic | ICD-10-CM

## 2020-06-15 DIAGNOSIS — I10 ESSENTIAL HYPERTENSION: Primary | Chronic | ICD-10-CM

## 2020-06-15 DIAGNOSIS — E78.5 DYSLIPIDEMIA: Chronic | ICD-10-CM

## 2020-06-15 DIAGNOSIS — I65.22 STENOSIS OF LEFT CAROTID ARTERY: Chronic | ICD-10-CM

## 2020-06-15 NOTE — PATIENT INSTRUCTIONS
DASH Diet: Care Instructions Your Care Instructions The DASH diet is an eating plan that can help lower your blood pressure. DASH stands for Dietary Approaches to Stop Hypertension. Hypertension is high blood pressure. The DASH diet focuses on eating foods that are high in calcium, potassium, and magnesium. These nutrients can lower blood pressure. The foods that are highest in these nutrients are fruits, vegetables, low-fat dairy products, nuts, seeds, and legumes. But taking calcium, potassium, and magnesium supplements instead of eating foods that are high in those nutrients does not have the same effect. The DASH diet also includes whole grains, fish, and poultry. The DASH diet is one of several lifestyle changes your doctor may recommend to lower your high blood pressure. Your doctor may also want you to decrease the amount of sodium in your diet. Lowering sodium while following the DASH diet can lower blood pressure even further than just the DASH diet alone. Follow-up care is a key part of your treatment and safety. Be sure to make and go to all appointments, and call your doctor if you are having problems. It's also a good idea to know your test results and keep a list of the medicines you take. How can you care for yourself at home? Following the DASH diet · Eat 4 to 5 servings of fruit each day. A serving is 1 medium-sized piece of fruit, ½ cup chopped or canned fruit, 1/4 cup dried fruit, or 4 ounces (½ cup) of fruit juice. Choose fruit more often than fruit juice. · Eat 4 to 5 servings of vegetables each day. A serving is 1 cup of lettuce or raw leafy vegetables, ½ cup of chopped or cooked vegetables, or 4 ounces (½ cup) of vegetable juice. Choose vegetables more often than vegetable juice. · Get 2 to 3 servings of low-fat and fat-free dairy each day. A serving is 8 ounces of milk, 1 cup of yogurt, or 1 ½ ounces of cheese. · Eat 6 to 8 servings of grains each day. A serving is 1 slice of bread, 1 ounce of dry cereal, or ½ cup of cooked rice, pasta, or cooked cereal. Try to choose whole-grain products as much as possible. · Limit lean meat, poultry, and fish to 2 servings each day. A serving is 3 ounces, about the size of a deck of cards. · Eat 4 to 5 servings of nuts, seeds, and legumes (cooked dried beans, lentils, and split peas) each week. A serving is 1/3 cup of nuts, 2 tablespoons of seeds, or ½ cup of cooked beans or peas. · Limit fats and oils to 2 to 3 servings each day. A serving is 1 teaspoon of vegetable oil or 2 tablespoons of salad dressing. · Limit sweets and added sugars to 5 servings or less a week. A serving is 1 tablespoon jelly or jam, ½ cup sorbet, or 1 cup of lemonade. · Eat less than 2,300 milligrams (mg) of sodium a day. If you limit your sodium to 1,500 mg a day, you can lower your blood pressure even more. Tips for success · Start small. Do not try to make dramatic changes to your diet all at once. You might feel that you are missing out on your favorite foods and then be more likely to not follow the plan. Make small changes, and stick with them. Once those changes become habit, add a few more changes. · Try some of the following: ? Make it a goal to eat a fruit or vegetable at every meal and at snacks. This will make it easy to get the recommended amount of fruits and vegetables each day. ? Try yogurt topped with fruit and nuts for a snack or healthy dessert. ? Add lettuce, tomato, cucumber, and onion to sandwiches. ? Combine a ready-made pizza crust with low-fat mozzarella cheese and lots of vegetable toppings. Try using tomatoes, squash, spinach, broccoli, carrots, cauliflower, and onions. ? Have a variety of cut-up vegetables with a low-fat dip as an appetizer instead of chips and dip. ? Sprinkle sunflower seeds or chopped almonds over salads.  Or try adding chopped walnuts or almonds to cooked vegetables. ? Try some vegetarian meals using beans and peas. Add garbanzo or kidney beans to salads. Make burritos and tacos with mashed phelps beans or black beans. Where can you learn more? Go to http://matthew-migel.info/ Enter B181 in the search box to learn more about \"DASH Diet: Care Instructions. \" Current as of: December 16, 2019               Content Version: 12.5 © 8844-0214 [employer]. Care instructions adapted under license by [employer] (which disclaims liability or warranty for this information). If you have questions about a medical condition or this instruction, always ask your healthcare professional. [address] any warranty or liability for your use of this information.

## 2020-06-15 NOTE — PROGRESS NOTES
Alexandro Davey is a 59 y.o. male presenting for Follow Up Chronic Condition (6 mo fu)  . 1. Have you been to the ER, urgent care clinic since your last visit? Hospitalized since your last visit? No    2. Have you seen or consulted any other health care providers outside of the [address] [place] [address] since your last visit? Include any pap smears or colon screening. No    No flowsheet data found. Abuse Screening Questionnaire 6/15/2020   Do you ever feel afraid of your partner? N   Are you in a relationship with someone who physically or mentally threatens you? N   Is it safe for you to go home? Y       3 most recent PHQ Screens 6/15/2020   Little interest or pleasure in doing things Not at all   Feeling down, depressed, irritable, or hopeless Not at all   Total Score PHQ 2 0       There are no discontinued medications.

## 2020-06-15 NOTE — PROGRESS NOTES
This note will not be viewable in [address] Lacy Worrell is a 59 y.o. male and presents with Follow Up Chronic Condition (6 mo fu)  . Subjective:  Mr. Keny Segura returns to the office today in follow-up of multiple medical problems. The patient has hypertension and remains on olmesartan. He tolerates this without cough, orthostatic dizziness or rash. He denies headaches, numbness, tingling or focal neurological problems. Dyslipidemia is currently managed on Crestor therapy. He tolerates this without muscle soreness or GI upset. The patient has a prior history of TIA and remains on a daily aspirin. There was a left carotid stenosis as well. He has had no recurrent symptoms of stroke and denies exertional chest pains or claudication. Past Medical History:   Diagnosis Date    Bruit of right carotid artery 10/25/2018    Cough 9/13/2017    Dyslipidemia 9/13/2017    Eczema     to hands    Essential hypertension 9/13/2017    Hand eczema 9/13/2017    Herpes zoster without complications 5/32/7286    High cholesterol     History of Guillain-Floweree syndrome 9/13/2017    Hx of chest tube placement 1976    Hypertension     Ill-defined condition     polypneumothroax age 24    Long-term use of high-risk medication 9/13/2017    Posterior tibial tendinitis of right leg 9/13/2017    Right ankle pain 9/13/2017    Stenosis of left carotid artery 9/13/2017    TIA (transient ischemic attack) 9/13/2017     Past Surgical History:   Procedure Laterality Date    HX HEENT      septoplasty    HX OTHER SURGICAL      colonoscopy    HX TONSILLECTOMY       Allergies   Allergen Reactions    Influenza Virus Vaccine, Specific Other (comments)     Malou Gutierrez     Current Outpatient Medications   Medication Sig Dispense Refill    rosuvastatin (CRESTOR) 20 mg tablet TAKE 1 TAB BY MOUTH NIGHTLY.  90 Tab 3    olmesartan (BENICAR) 40 mg tablet TAKE 1 TABLET BY MOUTH EVERY DAY 90 Tab 3    FEXOFENADINE HCL (ALLEGRA PO) Take 1 Tab by mouth as needed.  multivitamin (ONE A DAY) tablet Take 1 Tab by mouth daily.  aspirin delayed-release 81 mg tablet Take 1 Tab by mouth daily. Social History     Socioeconomic History    Marital status:      Spouse name: Not on file    Number of children: Not on file    Years of education: Not on file    Highest education level: Not on file   Tobacco Use    Smoking status: Never Smoker    Smokeless tobacco: Never Used   Substance and Sexual Activity    Alcohol use:  Yes     Alcohol/week: 20.0 standard drinks     Types: 20 Cans of beer per week     Comment: none in 10 days    Drug use: No    Sexual activity: Never     Family History   Problem Relation Age of Onset    No Known Problems Mother     Diabetes Father     Hypertension Father     Cataract Father        Health Maintenance   Topic Date Due    DTaP/Tdap/Td series (1 - Tdap) 09/25/1976    Shingrix Vaccine Age 50> (1 of 2) 09/25/2005    Influenza Age 5 to Adult  08/01/2020    Lipid Screen  10/14/2020    Cologuard Q3Y  11/06/2021    Hepatitis C Screening  Completed    Pneumococcal 0-64 years  Aged Out        Review of Systems  Constitutional: negative for fevers, chills, anorexia and weight loss  Eyes:   negative for visual disturbance and irritation  ENT:   negative for tinnitus,sore throat,nasal congestion,ear pain,hoarseness  Respiratory:  negative for cough, hemoptysis, dyspnea,wheezing  CV:   negative for chest pain, palpitations, lower extremity edema  GI:   negative for nausea, vomiting, diarrhea, abdominal pain,melena  Endo:               negative for polyuria,polydipsia,polyphagia,heat intolerance  Genitourinary: negative for frequency, dysuria and hematuria  Integumentary: negative for rash and pruritus  Hematologic:  negative for easy bruising and gum/nose bleeding  Musculoskel: negative for myalgias, arthralgias, back pain, muscle weakness, joint pain  Neurological:  negative for headaches, dizziness, vertigo, memory problems and gait   Behavl/Psych: negative for feelings of anxiety, depression, mood changes  ROS otherwise negative      Objective:  Visit Vitals  /68 (BP 1 Location: Right arm, BP Patient Position: Sitting)   Pulse 79   Temp 96.9 °F (36.1 °C) (Oral)   Resp 16   Ht 6' (1.829 m)   Wt 181 lb (82.1 kg)   SpO2 99%   BMI 24.55 kg/m²     Body mass index is 24.55 kg/m². Physical Exam:   General appearance - alert, well appearing, and in no distress  Mental status - alert, oriented to person, place, and time  EYE-GAURAV, EOMI,conjunctiva normal bilaterally, lids normal  ENT-ENT exam normal, no neck nodes or sinus tenderness  Nose - normal and patent, no erythema,  Or discharge   Mouth - mucous membranes moist, pharynx normal without lesions  Neck - supple, no significant adenopathy or bruit  Chest - clear to auscultation, no wheezes, rales or rhonchi. Heart - normal rate, regular rhythm, normal S1, S2, no murmurs, rubs, clicks or gallops   Abdomen - soft, nontender, nondistended, no masses or organomegaly  Lymph- no adenopathy palpable  Ext-peripheral pulses normal, no pedal edema, no clubbing or cyanosis  Skin-Warm and dry. no hyperpigmentation, vitiligo, or suspicious lesions  Neuro -alert, oriented, normal speech, no focal findings or movement disorder noted      Assessment/Plan:  Diagnoses and all orders for this visit:    Essential hypertension    Dyslipidemia    Long-term use of high-risk medication    Stenosis of left carotid artery    TIA (transient ischemic attack)        Other instructions: The patient's medications were reviewed and reconciled. No change in his current medical regimen will be made. A no added salt, prudent diet and exercise are encouraged. Labs from 10/14 were reviewed with the patient today. Follow-up in 6 months    Follow-up and Dispositions    · Return in about 6 months (around 12/15/2020).          I have reviewed with the patient details of the assessment and plan and all questions were answered. Relevent patient education was performed. The most recent lab findings were reviewed with the patient. An After Visit Summary was printed and given to the patient. Angi Huang MD    Please note that this dictation was completed with [employer], the computer voice recognition software. Quite often unanticipated grammatical, syntax, homophones, and other interpretive errors are inadvertently transcribed by the computer software. Please disregard these errors. Please excuse any errors that have escaped final proofreading.

## 2020-09-29 DIAGNOSIS — I10 ESSENTIAL HYPERTENSION: ICD-10-CM

## 2020-09-29 RX ORDER — OLMESARTAN MEDOXOMIL 40 MG/1
TABLET ORAL
Qty: 90 TAB | Refills: 3 | Status: SHIPPED | OUTPATIENT
Start: 2020-09-29 | End: 2021-08-24 | Stop reason: SDUPTHER

## 2020-12-18 RX ORDER — ROSUVASTATIN CALCIUM 20 MG/1
TABLET, COATED ORAL
Qty: 90 TAB | Refills: 2 | Status: SHIPPED | OUTPATIENT
Start: 2020-12-18 | End: 2021-09-17 | Stop reason: SDUPTHER

## 2020-12-18 NOTE — TELEPHONE ENCOUNTER
Requested Prescriptions     Pending Prescriptions Disp Refills    rosuvastatin (CRESTOR) 20 mg tablet 90 Tab 3       Last Refill: 12/18/19  Next Appointment:12/21/20

## 2020-12-21 ENCOUNTER — OFFICE VISIT (OUTPATIENT)
Dept: INTERNAL MEDICINE CLINIC | Age: 65
End: 2020-12-21
Payer: COMMERCIAL

## 2020-12-21 VITALS
SYSTOLIC BLOOD PRESSURE: 124 MMHG | RESPIRATION RATE: 16 BRPM | WEIGHT: 183.2 LBS | BODY MASS INDEX: 24.81 KG/M2 | HEIGHT: 72 IN | TEMPERATURE: 98.2 F | HEART RATE: 68 BPM | DIASTOLIC BLOOD PRESSURE: 76 MMHG | OXYGEN SATURATION: 99 %

## 2020-12-21 DIAGNOSIS — I65.22 STENOSIS OF LEFT CAROTID ARTERY: Chronic | ICD-10-CM

## 2020-12-21 DIAGNOSIS — Z12.5 PROSTATE CANCER SCREENING: ICD-10-CM

## 2020-12-21 DIAGNOSIS — I10 ESSENTIAL HYPERTENSION: Chronic | ICD-10-CM

## 2020-12-21 DIAGNOSIS — Z00.00 ROUTINE PHYSICAL EXAMINATION: Primary | ICD-10-CM

## 2020-12-21 DIAGNOSIS — E78.5 DYSLIPIDEMIA: Chronic | ICD-10-CM

## 2020-12-21 DIAGNOSIS — G45.9 TIA (TRANSIENT ISCHEMIC ATTACK): Chronic | ICD-10-CM

## 2020-12-21 DIAGNOSIS — Z79.899 LONG-TERM USE OF HIGH-RISK MEDICATION: Chronic | ICD-10-CM

## 2020-12-21 LAB
A-G RATIO,AGRAT: 1.6 RATIO
ALBUMIN SERPL-MCNC: 4.6 G/DL (ref 3.9–5.4)
ALP SERPL-CCNC: 90 U/L (ref 38–126)
ALT SERPL-CCNC: 31 U/L (ref 0–50)
ANION GAP SERPL CALC-SCNC: 9 MMOL/L
AST SERPL W P-5'-P-CCNC: 33 U/L (ref 14–36)
BACTERIA,BACTU: ABNORMAL
BILIRUB SERPL-MCNC: 1.1 MG/DL (ref 0.2–1.3)
BILIRUB UR QL: NEGATIVE
BUN SERPL-MCNC: 12 MG/DL (ref 9–20)
BUN/CREATININE RATIO,BUCR: 11 RATIO
CALCIUM SERPL-MCNC: 9.8 MG/DL (ref 8.4–10.2)
CHLORIDE SERPL-SCNC: 104 MMOL/L (ref 98–107)
CHOL/HDL RATIO,CHHD: 3 RATIO (ref 0–4)
CHOLEST SERPL-MCNC: 147 MG/DL (ref 0–200)
CK SERPL-CCNC: 76 U/L (ref 30–135)
CLARITY: CLEAR
CO2 SERPL-SCNC: 30 MMOL/L (ref 22–32)
COLOR UR: ABNORMAL
CREAT SERPL-MCNC: 1.1 MG/DL (ref 0.8–1.5)
ERYTHROCYTE [DISTWIDTH] IN BLOOD BY AUTOMATED COUNT: 12.5 %
GLOBULIN,GLOB: 2.9
GLUCOSE 24H UR-MRATE: NEGATIVE G/(24.H)
GLUCOSE SERPL-MCNC: 122 MG/DL (ref 75–110)
HCT VFR BLD AUTO: 48.2 % (ref 37–51)
HDLC SERPL-MCNC: 47 MG/DL (ref 35–130)
HGB BLD-MCNC: 15.8 G/DL (ref 12–18)
HGB UR QL STRIP: NEGATIVE
KETONES UR QL STRIP.AUTO: NEGATIVE
LDL/HDL RATIO,LDHD: 1 RATIO
LDLC SERPL CALC-MCNC: 68 MG/DL (ref 0–130)
LEUKOCYTE ESTERASE: NEGATIVE
LYMPHOCYTES ABSOLUTE: 1.5 K/UL (ref 0.6–4.1)
LYMPHOCYTES NFR BLD: 21.6 % (ref 10–58.5)
MCH RBC QN AUTO: 31.7 PG (ref 26–32)
MCHC RBC AUTO-ENTMCNC: 32.8 G/DL (ref 30–36)
MCV RBC AUTO: 96.8 FL (ref 80–97)
MONOCYTES ABS-DIF,2141: 0.6 K/UL (ref 0–1.8)
MONOCYTES NFR BLD: 8.7 % (ref 0.1–24)
NEUTROPHILS # BLD: 69.7 % (ref 37–92)
NEUTROPHILS ABS,2156: 4.9 K/UL (ref 2–7.8)
NITRITE UR QL STRIP.AUTO: NEGATIVE
PH UR STRIP: 6 [PH] (ref 5–7)
PLATELET # BLD AUTO: 194 K/UL (ref 140–440)
POTASSIUM SERPL-SCNC: 5 MMOL/L (ref 3.6–5)
PROT SERPL-MCNC: 7.5 G/DL (ref 6.3–8.2)
PROT UR STRIP-MCNC: NEGATIVE MG/DL
PSA, TEST22: 1.4 NG/ML (ref 0–4)
RBC # BLD AUTO: 4.98 M/UL (ref 4.2–6.3)
RBC #/AREA URNS HPF: 0 #/HPF
SODIUM SERPL-SCNC: 143 MMOL/L (ref 137–145)
SP GR UR REFRACTOMETRY: 1.02 (ref 1–1.03)
TRIGL SERPL-MCNC: 158 MG/DL (ref 0–200)
TSH SERPL DL<=0.05 MIU/L-ACNC: 0.82 UIU/ML (ref 0.34–5.6)
UROBILINOGEN UR QL STRIP.AUTO: NEGATIVE
VLDLC SERPL CALC-MCNC: 32 MG/DL
WBC # BLD AUTO: 7.1 K/UL (ref 4.1–10.9)
WBC URNS QL MICRO: ABNORMAL #/HPF

## 2020-12-21 PROCEDURE — 82550 ASSAY OF CK (CPK): CPT | Performed by: INTERNAL MEDICINE

## 2020-12-21 PROCEDURE — 85025 COMPLETE CBC W/AUTO DIFF WBC: CPT | Performed by: INTERNAL MEDICINE

## 2020-12-21 PROCEDURE — 80061 LIPID PANEL: CPT | Performed by: INTERNAL MEDICINE

## 2020-12-21 PROCEDURE — 80053 COMPREHEN METABOLIC PANEL: CPT | Performed by: INTERNAL MEDICINE

## 2020-12-21 PROCEDURE — 84443 ASSAY THYROID STIM HORMONE: CPT | Performed by: INTERNAL MEDICINE

## 2020-12-21 PROCEDURE — 81001 URINALYSIS AUTO W/SCOPE: CPT | Performed by: INTERNAL MEDICINE

## 2020-12-21 PROCEDURE — 99397 PER PM REEVAL EST PAT 65+ YR: CPT | Performed by: INTERNAL MEDICINE

## 2020-12-21 PROCEDURE — G0103 PSA SCREENING: HCPCS | Performed by: INTERNAL MEDICINE

## 2020-12-21 PROCEDURE — 36415 COLL VENOUS BLD VENIPUNCTURE: CPT | Performed by: INTERNAL MEDICINE

## 2020-12-21 NOTE — PROGRESS NOTES
Ashley Nurse is a 72 y.o. male     Chief Complaint   Patient presents with    Complete Physical       Visit Vitals  /76 (BP 1 Location: Right arm, BP Patient Position: Sitting)   Pulse 68   Temp 98.2 °F (36.8 °C) (Temporal)   Resp 16   Ht 6' (1.829 m)   Wt 183 lb 3.2 oz (83.1 kg)   SpO2 99%   BMI 24.85 kg/m²       Health Maintenance Due   Topic Date Due    DTaP/Tdap/Td series (1 - Tdap) 09/25/1976    Shingrix Vaccine Age 50> (1 of 2) 09/25/2005    GLAUCOMA SCREENING Q2Y  09/25/2020    Pneumococcal 65+ years (1 of 1 - PPSV23) 09/25/2020    Lipid Screen  10/14/2020       1. Have you been to the ER, urgent care clinic since your last visit? Hospitalized since your last visit? No    2. Have you seen or consulted any other health care providers outside of the [address] [place] [address] since your last visit? Include any pap smears or colon screening.  No

## 2020-12-21 NOTE — PATIENT INSTRUCTIONS
DASH Diet: Care Instructions Your Care Instructions The DASH diet is an eating plan that can help lower your blood pressure. DASH stands for Dietary Approaches to Stop Hypertension. Hypertension is high blood pressure. The DASH diet focuses on eating foods that are high in calcium, potassium, and magnesium. These nutrients can lower blood pressure. The foods that are highest in these nutrients are fruits, vegetables, low-fat dairy products, nuts, seeds, and legumes. But taking calcium, potassium, and magnesium supplements instead of eating foods that are high in those nutrients does not have the same effect. The DASH diet also includes whole grains, fish, and poultry. The DASH diet is one of several lifestyle changes your doctor may recommend to lower your high blood pressure. Your doctor may also want you to decrease the amount of sodium in your diet. Lowering sodium while following the DASH diet can lower blood pressure even further than just the DASH diet alone. Follow-up care is a key part of your treatment and safety. Be sure to make and go to all appointments, and call your doctor if you are having problems. It's also a good idea to know your test results and keep a list of the medicines you take. How can you care for yourself at home? Following the DASH diet · Eat 4 to 5 servings of fruit each day. A serving is 1 medium-sized piece of fruit, ½ cup chopped or canned fruit, 1/4 cup dried fruit, or 4 ounces (½ cup) of fruit juice. Choose fruit more often than fruit juice. · Eat 4 to 5 servings of vegetables each day. A serving is 1 cup of lettuce or raw leafy vegetables, ½ cup of chopped or cooked vegetables, or 4 ounces (½ cup) of vegetable juice. Choose vegetables more often than vegetable juice. · Get 2 to 3 servings of low-fat and fat-free dairy each day. A serving is 8 ounces of milk, 1 cup of yogurt, or 1 ½ ounces of cheese. · Eat 6 to 8 servings of grains each day. A serving is 1 slice of bread, 1 ounce of dry cereal, or ½ cup of cooked rice, pasta, or cooked cereal. Try to choose whole-grain products as much as possible. · Limit lean meat, poultry, and fish to 2 servings each day. A serving is 3 ounces, about the size of a deck of cards. · Eat 4 to 5 servings of nuts, seeds, and legumes (cooked dried beans, lentils, and split peas) each week. A serving is 1/3 cup of nuts, 2 tablespoons of seeds, or ½ cup of cooked beans or peas. · Limit fats and oils to 2 to 3 servings each day. A serving is 1 teaspoon of vegetable oil or 2 tablespoons of salad dressing. · Limit sweets and added sugars to 5 servings or less a week. A serving is 1 tablespoon jelly or jam, ½ cup sorbet, or 1 cup of lemonade. · Eat less than 2,300 milligrams (mg) of sodium a day. If you limit your sodium to 1,500 mg a day, you can lower your blood pressure even more. Tips for success · Start small. Do not try to make dramatic changes to your diet all at once. You might feel that you are missing out on your favorite foods and then be more likely to not follow the plan. Make small changes, and stick with them. Once those changes become habit, add a few more changes. · Try some of the following: ? Make it a goal to eat a fruit or vegetable at every meal and at snacks. This will make it easy to get the recommended amount of fruits and vegetables each day. ? Try yogurt topped with fruit and nuts for a snack or healthy dessert. ? Add lettuce, tomato, cucumber, and onion to sandwiches. ? Combine a ready-made pizza crust with low-fat mozzarella cheese and lots of vegetable toppings. Try using tomatoes, squash, spinach, broccoli, carrots, cauliflower, and onions. ? Have a variety of cut-up vegetables with a low-fat dip as an appetizer instead of chips and dip. ? Sprinkle sunflower seeds or chopped almonds over salads. Or try adding chopped walnuts or almonds to cooked vegetables. ? Try some vegetarian meals using beans and peas. Add garbanzo or kidney beans to salads. Make burritos and tacos with mashed phelps beans or black beans. Where can you learn more? Go to http://www.gray.com/ Enter V910 in the search box to learn more about \"DASH Diet: Care Instructions. \" Current as of: December 16, 2019               Content Version: 12.6 © 4273-0766 [employer]. Care instructions adapted under license by [employer] (which disclaims liability or warranty for this information). If you have questions about a medical condition or this instruction, always ask your healthcare professional. [address] any warranty or liability for your use of this information.

## 2020-12-21 NOTE — PROGRESS NOTES
Subjective:     Bry Zamorano is a 72 y.o. male presenting for annual comprehensive personal healthcare examination. History of present illness: This patient has multiple medical problems. These include:    Mr. Samuel Mata is a 27-year-old  male who presents to the office today for complete checkup. Medical issues include hypertension for which she is on olmesartan. He tolerates this without any cough, rash, orthostatic dizziness or lower extremity edema. He denies headaches, numbness, tingling or focal neurological problems. He has hypercholesterolemia currently on statin therapy. He denies muscle soreness or GI upset. He does have a history of vascular disease with left-sided carotid artery stenosis and is followed by Dr. John Mcdonald. He did have a follow-up carotid Doppler done this past year which was unchanged. He denies any exertional chest pains or claudication. He has a history of TIA related to his carotid stenosis. He remains on daily aspirin therapy. He has had no reoccurrence of symptoms or problems related to his therapy.     Patient Active Problem List   Diagnosis Code    Carotid stenosis, symptomatic w/o infarct I65.29    History of Guillain-Bear Branch syndrome Z86.69    Hand eczema L30.9    Dyslipidemia E78.5    TIA (transient ischemic attack) G45.9    Stenosis of left carotid artery I65.22    Long-term use of high-risk medication Z79.899    Essential hypertension I10    Herpes zoster without complications F08.9    Right ankle pain M25.571    Cough R05    Posterior tibial tendinitis of right leg M76.821    Bruit of right carotid artery R09.89        Past Medical History:   Diagnosis Date    Bruit of right carotid artery 10/25/2018    Cough 9/13/2017    Dyslipidemia 9/13/2017    Eczema     to hands    Essential hypertension 9/13/2017    Hand eczema 9/13/2017    Herpes zoster without complications 8/50/4898    High cholesterol     History of Guillain-Bear Branch syndrome 9/13/2017    Hx of chest tube placement 1976    Hypertension     Ill-defined condition     polypneumothroax age 24    Long-term use of high-risk medication 9/13/2017    Posterior tibial tendinitis of right leg 9/13/2017    Right ankle pain 9/13/2017    Stenosis of left carotid artery 9/13/2017    TIA (transient ischemic attack) 9/13/2017     Past Surgical History:   Procedure Laterality Date    HX HEENT      septoplasty    HX OTHER SURGICAL      colonoscopy    HX TONSILLECTOMY       Allergies   Allergen Reactions    Influenza Virus Vaccine, Specific Other (comments)     Malou Gutierrez     Current Outpatient Medications   Medication Sig Dispense Refill    rosuvastatin (CRESTOR) 20 mg tablet TAKE 1 TAB BY MOUTH NIGHTLY. 90 Tab 2    olmesartan (BENICAR) 40 mg tablet TAKE 1 TABLET BY MOUTH EVERY DAY 90 Tab 3    multivitamin (ONE A DAY) tablet Take 1 Tab by mouth daily.  aspirin delayed-release 81 mg tablet Take 1 Tab by mouth daily. Social History     Socioeconomic History    Marital status:      Spouse name: Not on file    Number of children: Not on file    Years of education: Not on file    Highest education level: Not on file   Tobacco Use    Smoking status: Never Smoker    Smokeless tobacco: Never Used   Substance and Sexual Activity    Alcohol use:  Yes     Alcohol/week: 20.0 standard drinks     Types: 20 Cans of beer per week     Comment: none in 10 days    Drug use: No    Sexual activity: Never     Family History   Problem Relation Age of Onset    No Known Problems Mother     Diabetes Father     Hypertension Father     Cataract Father        Health Maintenance   Topic Date Due    DTaP/Tdap/Td series (1 - Tdap) 09/25/1976    Shingrix Vaccine Age 50> (1 of 2) 09/25/2005    Pneumococcal 65+ years (1 of 1 - PPSV23) 09/25/2020    Lipid Screen  10/14/2020    Colorectal Cancer Screening Combo  11/06/2021    GLAUCOMA SCREENING Q2Y  11/26/2022    Hepatitis C Screening  Completed       Review of Systems  Constitutional:  He denies fevers, weight loss, sweats, or fatigue. HEENT:  No blurred or double vision, headaches or dizziness. No difficulty with swallowing, taste, speech or smell. Respiratory:  No cough, wheezing or shortness of breath, or sputum production. Cardiac:  Denies chest pain, palpitations, unexplained indigestion, syncope, edema, PND or orthopnea. GI:  No changes in bowel habits, abdominal pain, no bloating, anorexia, nausea, vomiting or heartburn. :  He denies frequency, nocturia, stranguria, dysuria or sexual dysfunction. Extremities:  No joint pain, stiffness or swelling. Skin:  No recent rash or mole changes. Neurological:  No numbness, tingling, burning paresthesias or loss of motor strength. No syncope, dizziness, frequent headaches or memory loss. ROS otherwise negative       Objective:     Vitals:    12/21/20 0806   BP: 124/76   Pulse: 68   Resp: 16   Temp: 98.2 °F (36.8 °C)   TempSrc: Temporal   SpO2: 99%   Weight: 183 lb 3.2 oz (83.1 kg)   Height: 6' (1.829 m)   PainSc:   0 - No pain      Body mass index is 24.85 kg/m². Physical exam:   General Appearance:  Well-developed, well-nourished, no acute distress. Vision:  Deferred to ophthalmologist.    Hearing: HEENT:    Ears:  The TMs and ear canals were clear. Eyes:  The pupillary responses were normal.  Extraocular muscle function intact. Lids and conjunctiva not injected. Funduscopic exam revealed sharp disc margins. Pharynx:  Clear with teeth in good repair. No masses were noted. Neck:  Supple without thyromegaly or adenopathy. No JVD noted. No carotid bruits. Lungs: Clear to auscultation and percussion. Cardiac:  Regular rate and rhythm. No murmur. PMI not displaced. No gallop, rub or click. Abdomen:  Flat, soft, non-tender without palpable organomegaly or mass. No pulsatile mass was felt, and no bruit was heard. Bowel sounds were active. Extremities:  No clubbing, cyanosis or edema. Pulses:  Dorsalis pedis and posterior tibial pulses felt without difficulty. Skin:  No unusual rash or mole changes noted. Lymph Nodes:  None felt in the cervical, supraclavicular, axillary or inguinal region. Neurological:  Cranial nerves II-XII grossly intact. Motor strength 5/5. DTRs 2+ and symmetric. Station and gait normal.         Assessment/Plan:   Impressions:  Diagnoses and all orders for this visit:    Routine physical examination  -     COLLECTION VENOUS BLOOD,VENIPUNCTURE  -     CBC WITH AUTOMATED DIFF  -     CK  -     LIPID PANEL  -     METABOLIC PANEL, COMPREHENSIVE  -     TSH 3RD GENERATION  -     PSA SCREENING (SCREENING)  -     URINALYSIS W/MICROSCOPIC    Essential hypertension    Dyslipidemia    Long-term use of high-risk medication    Stenosis of left carotid artery    TIA (transient ischemic attack)    Prostate cancer screening  -     PSA SCREENING (SCREENING)        Other instructions: The patient's medications were reviewed and reconciled. No change in his current medical regimen will be made. A no added salt, prudent diet is encouraged    Health maintenance issues were reviewed and he is in need of Tdap shingles vaccine and Pneumovax 23 which he will consider. Patient previously has had a history of Guillain-Barré syndrome. Await results of multiple labs    Follow-up 6 months    Follow-up and Dispositions    · Return in about 6 months (around 6/21/2021). Tg Dhillon MD    Please note that this dictation was completed with [employer], the computer voice recognition software. Quite often unanticipated grammatical, syntax, homophones, and other interpretive errors are inadvertently transcribed by the computer software. Please disregard these errors. Please excuse any errors that have escaped final proofreading.

## 2021-01-05 ENCOUNTER — LAB ONLY (OUTPATIENT)
Dept: INTERNAL MEDICINE CLINIC | Age: 66
End: 2021-01-05
Payer: COMMERCIAL

## 2021-01-05 DIAGNOSIS — R73.9 ELEVATED BLOOD SUGAR: Primary | ICD-10-CM

## 2021-01-05 LAB
GLUCOSE SERPL-MCNC: 121 MG/DL (ref 75–110)
HBA1C MFR BLD HPLC: 5.7 % (ref 4–5.7)

## 2021-01-05 PROCEDURE — 82947 ASSAY GLUCOSE BLOOD QUANT: CPT | Performed by: INTERNAL MEDICINE

## 2021-01-05 PROCEDURE — 83036 HEMOGLOBIN GLYCOSYLATED A1C: CPT | Performed by: INTERNAL MEDICINE

## 2021-06-22 ENCOUNTER — OFFICE VISIT (OUTPATIENT)
Dept: INTERNAL MEDICINE CLINIC | Age: 66
End: 2021-06-22
Payer: COMMERCIAL

## 2021-06-22 VITALS
WEIGHT: 183.2 LBS | BODY MASS INDEX: 24.81 KG/M2 | RESPIRATION RATE: 18 BRPM | SYSTOLIC BLOOD PRESSURE: 132 MMHG | HEIGHT: 72 IN | HEART RATE: 71 BPM | TEMPERATURE: 97.6 F | OXYGEN SATURATION: 99 % | DIASTOLIC BLOOD PRESSURE: 82 MMHG

## 2021-06-22 DIAGNOSIS — N52.9 ED (ERECTILE DYSFUNCTION) OF ORGANIC ORIGIN: ICD-10-CM

## 2021-06-22 DIAGNOSIS — E78.5 DYSLIPIDEMIA: Chronic | ICD-10-CM

## 2021-06-22 DIAGNOSIS — I65.22 STENOSIS OF LEFT CAROTID ARTERY: Chronic | ICD-10-CM

## 2021-06-22 DIAGNOSIS — G45.9 TIA (TRANSIENT ISCHEMIC ATTACK): Chronic | ICD-10-CM

## 2021-06-22 DIAGNOSIS — I10 ESSENTIAL HYPERTENSION: Primary | Chronic | ICD-10-CM

## 2021-06-22 DIAGNOSIS — Z79.899 LONG-TERM USE OF HIGH-RISK MEDICATION: Chronic | ICD-10-CM

## 2021-06-22 PROCEDURE — 99214 OFFICE O/P EST MOD 30 MIN: CPT | Performed by: INTERNAL MEDICINE

## 2021-06-22 RX ORDER — SILDENAFIL 50 MG/1
50 TABLET, FILM COATED ORAL AS NEEDED
Qty: 30 TABLET | Refills: 0 | Status: SHIPPED | OUTPATIENT
Start: 2021-06-22 | End: 2021-12-27

## 2021-06-22 NOTE — PROGRESS NOTES
Ivone Adame is a 72 y.o. male presenting for Follow Up Chronic Condition (6 mo fu)  . 1. Have you been to the ER, urgent care clinic since your last visit? Hospitalized since your last visit? No    2. Have you seen or consulted any other health care providers outside of the [address] [place] [address] since your last visit? Include any pap smears or colon screening. No    Fall Risk Assessment, last 12 mths 6/22/2021   Able to walk? Yes   Fall in past 12 months? 0   Do you feel unsteady? 0   Are you worried about falling 0         Abuse Screening Questionnaire 6/22/2021   Do you ever feel afraid of your partner? N   Are you in a relationship with someone who physically or mentally threatens you? N   Is it safe for you to go home? Y       3 most recent PHQ Screens 6/22/2021   Little interest or pleasure in doing things Not at all   Feeling down, depressed, irritable, or hopeless Not at all   Total Score PHQ 2 0       There are no discontinued medications.

## 2021-06-22 NOTE — PATIENT INSTRUCTIONS
DASH Diet: Care Instructions Your Care Instructions The DASH diet is an eating plan that can help lower your blood pressure. DASH stands for Dietary Approaches to Stop Hypertension. Hypertension is high blood pressure. The DASH diet focuses on eating foods that are high in calcium, potassium, and magnesium. These nutrients can lower blood pressure. The foods that are highest in these nutrients are fruits, vegetables, low-fat dairy products, nuts, seeds, and legumes. But taking calcium, potassium, and magnesium supplements instead of eating foods that are high in those nutrients does not have the same effect. The DASH diet also includes whole grains, fish, and poultry. The DASH diet is one of several lifestyle changes your doctor may recommend to lower your high blood pressure. Your doctor may also want you to decrease the amount of sodium in your diet. Lowering sodium while following the DASH diet can lower blood pressure even further than just the DASH diet alone. Follow-up care is a key part of your treatment and safety. Be sure to make and go to all appointments, and call your doctor if you are having problems. It's also a good idea to know your test results and keep a list of the medicines you take. How can you care for yourself at home? Following the DASH diet · Eat 4 to 5 servings of fruit each day. A serving is 1 medium-sized piece of fruit, ½ cup chopped or canned fruit, 1/4 cup dried fruit, or 4 ounces (½ cup) of fruit juice. Choose fruit more often than fruit juice. · Eat 4 to 5 servings of vegetables each day. A serving is 1 cup of lettuce or raw leafy vegetables, ½ cup of chopped or cooked vegetables, or 4 ounces (½ cup) of vegetable juice. Choose vegetables more often than vegetable juice. · Get 2 to 3 servings of low-fat and fat-free dairy each day. A serving is 8 ounces of milk, 1 cup of yogurt, or 1 ½ ounces of cheese. · Eat 6 to 8 servings of grains each day.  A serving is 1 slice of bread, 1 ounce of dry cereal, or ½ cup of cooked rice, pasta, or cooked cereal. Try to choose whole-grain products as much as possible. · Limit lean meat, poultry, and fish to 2 servings each day. A serving is 3 ounces, about the size of a deck of cards. · Eat 4 to 5 servings of nuts, seeds, and legumes (cooked dried beans, lentils, and split peas) each week. A serving is 1/3 cup of nuts, 2 tablespoons of seeds, or ½ cup of cooked beans or peas. · Limit fats and oils to 2 to 3 servings each day. A serving is 1 teaspoon of vegetable oil or 2 tablespoons of salad dressing. · Limit sweets and added sugars to 5 servings or less a week. A serving is 1 tablespoon jelly or jam, ½ cup sorbet, or 1 cup of lemonade. · Eat less than 2,300 milligrams (mg) of sodium a day. If you limit your sodium to 1,500 mg a day, you can lower your blood pressure even more. · Be aware that all of these are the suggested number of servings for people who eat 1,800 to 2,000 calories a day. Your recommended number of servings may be different if you need more or fewer calories. Tips for success · Start small. Do not try to make dramatic changes to your diet all at once. You might feel that you are missing out on your favorite foods and then be more likely to not follow the plan. Make small changes, and stick with them. Once those changes become habit, add a few more changes. · Try some of the following: ? Make it a goal to eat a fruit or vegetable at every meal and at snacks. This will make it easy to get the recommended amount of fruits and vegetables each day. ? Try yogurt topped with fruit and nuts for a snack or healthy dessert. ? Add lettuce, tomato, cucumber, and onion to sandwiches. ? Combine a ready-made pizza crust with low-fat mozzarella cheese and lots of vegetable toppings. Try using tomatoes, squash, spinach, broccoli, carrots, cauliflower, and onions. ?  Have a variety of cut-up vegetables with a low-fat dip as an appetizer instead of chips and dip. ? Sprinkle sunflower seeds or chopped almonds over salads. Or try adding chopped walnuts or almonds to cooked vegetables. ? Try some vegetarian meals using beans and peas. Add garbanzo or kidney beans to salads. Make burritos and tacos with mashed phelps beans or black beans. Where can you learn more? Go to http://www.gray.com/ Enter N488 in the search box to learn more about \"DASH Diet: Care Instructions. \" Current as of: August 31, 2020               Content Version: 12.8 © 4438-2296 [employer]. Care instructions adapted under license by [employer] (which disclaims liability or warranty for this information). If you have questions about a medical condition or this instruction, always ask your healthcare professional. [address] any warranty or liability for your use of this information.

## 2021-06-22 NOTE — PROGRESS NOTES
Subjective:     Dalila Sterling returns to the office today in follow-up of multiple medical problems. The patient has hypertension currently on olmesartan. Tolerates this without cough, orthostatic dizziness or lower extremity edema. Denies headaches, numbness, tingling or focal neurological problems. Remains on Crestor for his hypercholesterolemia currently tolerating this without muscle soreness or GI upset. He does have a prior history of TIA for which she is taking an aspirin and also has a history of carotid stenosis which she has checked yearly. He denies any chest pain, claudication or strokelike symptoms. The patient has had problems recently with sexual dysfunction. He has difficulty with erections and would like to try one of the ED medications. Patient is not currently on any type of nitrate. Past Medical History:   Diagnosis Date    Bruit of right carotid artery 10/25/2018    Cough 9/13/2017    Dyslipidemia 9/13/2017    Eczema     to hands    Essential hypertension 9/13/2017    Hand eczema 9/13/2017    Herpes zoster without complications 3/31/3165    High cholesterol     History of Guillain-Somers syndrome 9/13/2017    Hx of chest tube placement 1976    Hypertension     Ill-defined condition     polypneumothroax age 24    Long-term use of high-risk medication 9/13/2017    Posterior tibial tendinitis of right leg 9/13/2017    Right ankle pain 9/13/2017    Stenosis of left carotid artery 9/13/2017    TIA (transient ischemic attack) 9/13/2017     Past Surgical History:   Procedure Laterality Date    HX HEENT      septoplasty    HX OTHER SURGICAL      colonoscopy    HX TONSILLECTOMY       Allergies   Allergen Reactions    Influenza Virus Vaccine, Specific Other (comments)     Malou Gutierrez     Current Outpatient Medications   Medication Sig Dispense Refill    sildenafil citrate (VIAGRA) 50 mg tablet Take 1 Tablet by mouth as needed for Erectile Dysfunction.  30 Tablet 0    rosuvastatin (CRESTOR) 20 mg tablet TAKE 1 TAB BY MOUTH NIGHTLY. 90 Tab 2    olmesartan (BENICAR) 40 mg tablet TAKE 1 TABLET BY MOUTH EVERY DAY 90 Tab 3    multivitamin (ONE A DAY) tablet Take 1 Tab by mouth daily.  aspirin delayed-release 81 mg tablet Take 1 Tab by mouth daily. Social History     Socioeconomic History    Marital status:      Spouse name: Not on file    Number of children: Not on file    Years of education: Not on file    Highest education level: Not on file   Tobacco Use    Smoking status: Never Smoker    Smokeless tobacco: Never Used   Substance and Sexual Activity    Alcohol use: Yes     Alcohol/week: 20.0 standard drinks     Types: 20 Cans of beer per week     Comment: none in 10 days    Drug use: No    Sexual activity: Never     Social Determinants of Health     Financial Resource Strain:     Difficulty of Paying Living Expenses:    Food Insecurity:     Worried About Running Out of Food in the Last Year:     [address] in the Last Year:    Transportation Needs:     Lack of Transportation (Medical):      Lack of Transportation (Non-Medical):    Physical Activity:     Days of Exercise per Week:     Minutes of Exercise per Session:    Stress:     Feeling of Stress :    Social Connections:     Frequency of Communication with Friends and Family:     Frequency of Social Gatherings with Friends and Family:     Attends Confucianism Services:     Active Member of Clubs or Organizations:     Attends Club or Organization Meetings:     Marital Status:      Family History   Problem Relation Age of Onset    No Known Problems Mother     Diabetes Father     Hypertension Father     Cataract Father        Review of Systems:  GEN: no weight loss, weight gain, fatigue or night sweats  CV: no PND, orthopnea, or palpitations  Resp: no dyspnea on exertion, no cough  Abd: no nausea, vomiting or diarrhea  EXT: denies edema, claudication  Endocrine: no hair loss, excessive thirst or polyuria  Neurological ROS: no TIA or stroke symptoms  ROS otherwise negative      Objective:     Visit Vitals  /82 (BP 1 Location: Left upper arm, BP Patient Position: Sitting, BP Cuff Size: Adult)   Pulse 71   Temp 97.6 °F (36.4 °C) (Oral)   Resp 18   Ht 6' (1.829 m)   Wt 183 lb 3.2 oz (83.1 kg)   SpO2 99%   BMI 24.85 kg/m²     Body mass index is 24.85 kg/m². General:   alert, cooperative and no distress   Eyes: conjunctivae/sclerae clear. PERRL, EOM's intact   Mouth:  No oral lesions, no pharyngeal erythema, no exudates   Neck: Trachea midline, no thyromegaly, no bruits   Heart: S1 and S2 normal,no murmurs noted    Lungs: Clear to auscultation bilaterally, no increased work of breathing   Abdomen: Soft, nontender. Normal bowel sounds   Extremities: No edema or cyanosis   Neuro: ..alert, oriented x3,speech normal in context and clarity, cranial nerves II-XII intact,motor strength: full proximally and distally,gait: normal  reflexes: full and symmetric     Physical exam otherwise negative         Assessment/Plan:     Diagnoses and all orders for this visit:    Essential hypertension    Dyslipidemia    Long-term use of high-risk medication    TIA (transient ischemic attack)    Stenosis of left carotid artery    ED (erectile dysfunction) of organic origin  -     sildenafil citrate (VIAGRA) 50 mg tablet; Take 1 Tablet by mouth as needed for Erectile Dysfunction. , Normal, Disp-30 Tablet, R-0        Other instructions: The patient's medications were reviewed and reconciled. No change in his current medical regimen will be made. A no added salt, prudent diet is encouraged    Trial of Viagra for ED with prescription sent to pharmacy. Side effect profile of the medication was discussed with the patient. Labs from 12/21 were reviewed with the patient today    Follow-up in 6 months    Follow-up and Dispositions    · Return in about 6 months (around 12/22/2021).          Nanci Bergeron. Ml Koehler, MD    Please note that this dictation was completed with [employer], the computer voice recognition software. Quite often unanticipated grammatical, syntax, homophones, and other interpretive errors are inadvertently transcribed by the computer software. Please disregard these errors. Please excuse any errors that have escaped final proofreading.

## 2021-08-24 DIAGNOSIS — I10 ESSENTIAL HYPERTENSION: ICD-10-CM

## 2021-08-24 RX ORDER — OLMESARTAN MEDOXOMIL 40 MG/1
40 TABLET ORAL DAILY
Qty: 90 TABLET | Refills: 1 | Status: SHIPPED | OUTPATIENT
Start: 2021-08-24 | End: 2022-02-11 | Stop reason: SDUPTHER

## 2021-08-24 NOTE — TELEPHONE ENCOUNTER
Requested Prescriptions     Pending Prescriptions Disp Refills    olmesartan (BENICAR) 40 mg tablet 90 Tablet 3     Sig: Take 1 Tablet by mouth daily.        Last Refill: 9/29/20  Next Appointment:12/27/21

## 2021-09-17 RX ORDER — ROSUVASTATIN CALCIUM 20 MG/1
TABLET, COATED ORAL
Qty: 90 TABLET | Refills: 1 | Status: SHIPPED | OUTPATIENT
Start: 2021-09-17 | End: 2022-03-21 | Stop reason: SDUPTHER

## 2021-09-17 NOTE — TELEPHONE ENCOUNTER
Requested Prescriptions     Pending Prescriptions Disp Refills    rosuvastatin (CRESTOR) 20 mg tablet 90 Tablet 2     Sig: TAKE 1 TAB BY MOUTH NIGHTLY.        Last Refill: 12/18/20  Next Appointment:12/27/21

## 2021-12-27 ENCOUNTER — OFFICE VISIT (OUTPATIENT)
Dept: INTERNAL MEDICINE CLINIC | Age: 66
End: 2021-12-27
Payer: COMMERCIAL

## 2021-12-27 VITALS
HEIGHT: 72 IN | SYSTOLIC BLOOD PRESSURE: 126 MMHG | RESPIRATION RATE: 16 BRPM | WEIGHT: 181.2 LBS | OXYGEN SATURATION: 100 % | DIASTOLIC BLOOD PRESSURE: 70 MMHG | BODY MASS INDEX: 24.54 KG/M2 | HEART RATE: 67 BPM | TEMPERATURE: 97.8 F

## 2021-12-27 DIAGNOSIS — Z12.5 PROSTATE CANCER SCREENING: ICD-10-CM

## 2021-12-27 DIAGNOSIS — E78.5 DYSLIPIDEMIA: Chronic | ICD-10-CM

## 2021-12-27 DIAGNOSIS — I65.22 STENOSIS OF LEFT CAROTID ARTERY: Chronic | ICD-10-CM

## 2021-12-27 DIAGNOSIS — Z79.899 LONG-TERM USE OF HIGH-RISK MEDICATION: Chronic | ICD-10-CM

## 2021-12-27 DIAGNOSIS — I10 ESSENTIAL HYPERTENSION: Primary | Chronic | ICD-10-CM

## 2021-12-27 DIAGNOSIS — G45.9 TIA (TRANSIENT ISCHEMIC ATTACK): Chronic | ICD-10-CM

## 2021-12-27 LAB
ALBUMIN SERPL-MCNC: 4.2 G/DL (ref 3.5–5)
ALBUMIN/GLOB SERPL: 1.5 {RATIO} (ref 1.1–2.2)
ALP SERPL-CCNC: 88 U/L (ref 45–117)
ALT SERPL-CCNC: 30 U/L (ref 12–78)
ANION GAP SERPL CALC-SCNC: 3 MMOL/L (ref 5–15)
APPEARANCE UR: CLEAR
AST SERPL-CCNC: 21 U/L (ref 15–37)
BACTERIA URNS QL MICRO: NEGATIVE /HPF
BASOPHILS # BLD: 0 K/UL (ref 0–0.1)
BASOPHILS NFR BLD: 1 % (ref 0–1)
BILIRUB SERPL-MCNC: 0.8 MG/DL (ref 0.2–1)
BILIRUB UR QL: NEGATIVE
BUN SERPL-MCNC: 13 MG/DL (ref 6–20)
BUN/CREAT SERPL: 11 (ref 12–20)
CALCIUM SERPL-MCNC: 9 MG/DL (ref 8.5–10.1)
CHLORIDE SERPL-SCNC: 108 MMOL/L (ref 97–108)
CHOLEST SERPL-MCNC: 156 MG/DL
CK SERPL-CCNC: 54 U/L (ref 39–308)
CO2 SERPL-SCNC: 27 MMOL/L (ref 21–32)
COLOR UR: NORMAL
CREAT SERPL-MCNC: 1.16 MG/DL (ref 0.7–1.3)
DIFFERENTIAL METHOD BLD: ABNORMAL
EOSINOPHIL # BLD: 0.1 K/UL (ref 0–0.4)
EOSINOPHIL NFR BLD: 3 % (ref 0–7)
EPITH CASTS URNS QL MICRO: NORMAL /LPF
ERYTHROCYTE [DISTWIDTH] IN BLOOD BY AUTOMATED COUNT: 13 % (ref 11.5–14.5)
GLOBULIN SER CALC-MCNC: 2.8 G/DL (ref 2–4)
GLUCOSE SERPL-MCNC: 121 MG/DL (ref 65–100)
GLUCOSE UR STRIP.AUTO-MCNC: NEGATIVE MG/DL
HCT VFR BLD AUTO: 44.7 % (ref 36.6–50.3)
HDLC SERPL-MCNC: 42 MG/DL
HDLC SERPL: 3.7 {RATIO} (ref 0–5)
HGB BLD-MCNC: 15.5 G/DL (ref 12.1–17)
HGB UR QL STRIP: NEGATIVE
HYALINE CASTS URNS QL MICRO: NORMAL /LPF (ref 0–5)
IMM GRANULOCYTES # BLD AUTO: 0 K/UL (ref 0–0.04)
IMM GRANULOCYTES NFR BLD AUTO: 0 % (ref 0–0.5)
KETONES UR QL STRIP.AUTO: NEGATIVE MG/DL
LDLC SERPL CALC-MCNC: 82.6 MG/DL (ref 0–100)
LEUKOCYTE ESTERASE UR QL STRIP.AUTO: NEGATIVE
LYMPHOCYTES # BLD: 1.2 K/UL (ref 0.8–3.5)
LYMPHOCYTES NFR BLD: 23 % (ref 12–49)
MCH RBC QN AUTO: 32.4 PG (ref 26–34)
MCHC RBC AUTO-ENTMCNC: 34.7 G/DL (ref 30–36.5)
MCV RBC AUTO: 93.3 FL (ref 80–99)
MONOCYTES # BLD: 0.8 K/UL (ref 0–1)
MONOCYTES NFR BLD: 14 % (ref 5–13)
NEUTS SEG # BLD: 3.1 K/UL (ref 1.8–8)
NEUTS SEG NFR BLD: 59 % (ref 32–75)
NITRITE UR QL STRIP.AUTO: NEGATIVE
NRBC # BLD: 0 K/UL (ref 0–0.01)
NRBC BLD-RTO: 0 PER 100 WBC
PH UR STRIP: 5 [PH] (ref 5–8)
PLATELET # BLD AUTO: 206 K/UL (ref 150–400)
PMV BLD AUTO: 11.5 FL (ref 8.9–12.9)
POTASSIUM SERPL-SCNC: 4.7 MMOL/L (ref 3.5–5.1)
PROT SERPL-MCNC: 7 G/DL (ref 6.4–8.2)
PROT UR STRIP-MCNC: NEGATIVE MG/DL
PSA SERPL-MCNC: 1.4 NG/ML (ref 0.01–4)
RBC # BLD AUTO: 4.79 M/UL (ref 4.1–5.7)
RBC #/AREA URNS HPF: NORMAL /HPF (ref 0–5)
SODIUM SERPL-SCNC: 138 MMOL/L (ref 136–145)
SP GR UR REFRACTOMETRY: 1.01 (ref 1–1.03)
TRIGL SERPL-MCNC: 157 MG/DL (ref ?–150)
TSH SERPL DL<=0.05 MIU/L-ACNC: 1.07 UIU/ML (ref 0.36–3.74)
UA: UC IF INDICATED,UAUC: NORMAL
UROBILINOGEN UR QL STRIP.AUTO: 0.2 EU/DL (ref 0.2–1)
VLDLC SERPL CALC-MCNC: 31.4 MG/DL
WBC # BLD AUTO: 5.3 K/UL (ref 4.1–11.1)
WBC URNS QL MICRO: NORMAL /HPF (ref 0–4)

## 2021-12-27 PROCEDURE — 99214 OFFICE O/P EST MOD 30 MIN: CPT | Performed by: INTERNAL MEDICINE

## 2021-12-27 NOTE — PROGRESS NOTES
David Noe is a 77 y.o. male     Chief Complaint   Patient presents with    Hypertension     6 month follow up       Visit Vitals  /70 (BP 1 Location: Left arm, BP Patient Position: Sitting, BP Cuff Size: Adult)   Pulse 67   Temp 97.8 °F (36.6 °C) (Temporal)   Resp 16   Ht 6' (1.829 m)   Wt 181 lb 3.2 oz (82.2 kg)   SpO2 100%   BMI 24.58 kg/m²       Health Maintenance Due   Topic Date Due    DTaP/Tdap/Td series (1 - Tdap) Never done    Shingrix Vaccine Age 50> (1 of 2) Never done    Pneumococcal 65+ years (1 of 1 - PPSV23) Never done    COVID-19 Vaccine (3 - Booster for Moderna series) 10/03/2021    Colorectal Cancer Screening Combo  11/06/2021    Lipid Screen  12/21/2021    A1C test (Diabetic or Prediabetic)  01/05/2022       1. Have you been to the ER, urgent care clinic since your last visit? Hospitalized since your last visit? No     2. Have you seen or consulted any other health care providers outside of the [address] [place] [address] since your last visit? Include any pap smears or colon screening. Yes seen dermatology.

## 2021-12-27 NOTE — PATIENT INSTRUCTIONS
DASH Diet: Care Instructions  Your Care Instructions     The DASH diet is an eating plan that can help lower your blood pressure. DASH stands for Dietary Approaches to Stop Hypertension. Hypertension is high blood pressure. The DASH diet focuses on eating foods that are high in calcium, potassium, and magnesium. These nutrients can lower blood pressure. The foods that are highest in these nutrients are fruits, vegetables, low-fat dairy products, nuts, seeds, and legumes. But taking calcium, potassium, and magnesium supplements instead of eating foods that are high in those nutrients does not have the same effect. The DASH diet also includes whole grains, fish, and poultry. The DASH diet is one of several lifestyle changes your doctor may recommend to lower your high blood pressure. Your doctor may also want you to decrease the amount of sodium in your diet. Lowering sodium while following the DASH diet can lower blood pressure even further than just the DASH diet alone. Follow-up care is a key part of your treatment and safety. Be sure to make and go to all appointments, and call your doctor if you are having problems. It's also a good idea to know your test results and keep a list of the medicines you take. How can you care for yourself at home? Following the DASH diet  · Eat 4 to 5 servings of fruit each day. A serving is 1 medium-sized piece of fruit, ½ cup chopped or canned fruit, 1/4 cup dried fruit, or 4 ounces (½ cup) of fruit juice. Choose fruit more often than fruit juice. · Eat 4 to 5 servings of vegetables each day. A serving is 1 cup of lettuce or raw leafy vegetables, ½ cup of chopped or cooked vegetables, or 4 ounces (½ cup) of vegetable juice. Choose vegetables more often than vegetable juice. · Get 2 to 3 servings of low-fat and fat-free dairy each day. A serving is 8 ounces of milk, 1 cup of yogurt, or 1 ½ ounces of cheese. · Eat 6 to 8 servings of grains each day.  A serving is 1 slice of bread, 1 ounce of dry cereal, or ½ cup of cooked rice, pasta, or cooked cereal. Try to choose whole-grain products as much as possible. · Limit lean meat, poultry, and fish to 2 servings each day. A serving is 3 ounces, about the size of a deck of cards. · Eat 4 to 5 servings of nuts, seeds, and legumes (cooked dried beans, lentils, and split peas) each week. A serving is 1/3 cup of nuts, 2 tablespoons of seeds, or ½ cup of cooked beans or peas. · Limit fats and oils to 2 to 3 servings each day. A serving is 1 teaspoon of vegetable oil or 2 tablespoons of salad dressing. · Limit sweets and added sugars to 5 servings or less a week. A serving is 1 tablespoon jelly or jam, ½ cup sorbet, or 1 cup of lemonade. · Eat less than 2,300 milligrams (mg) of sodium a day. If you limit your sodium to 1,500 mg a day, you can lower your blood pressure even more. · Be aware that all of these are the suggested number of servings for people who eat 1,800 to 2,000 calories a day. Your recommended number of servings may be different if you need more or fewer calories. Tips for success  · Start small. Do not try to make dramatic changes to your diet all at once. You might feel that you are missing out on your favorite foods and then be more likely to not follow the plan. Make small changes, and stick with them. Once those changes become habit, add a few more changes. · Try some of the following:  ? Make it a goal to eat a fruit or vegetable at every meal and at snacks. This will make it easy to get the recommended amount of fruits and vegetables each day. ? Try yogurt topped with fruit and nuts for a snack or healthy dessert. ? Add lettuce, tomato, cucumber, and onion to sandwiches. ? Combine a ready-made pizza crust with low-fat mozzarella cheese and lots of vegetable toppings. Try using tomatoes, squash, spinach, broccoli, carrots, cauliflower, and onions. ?  Have a variety of cut-up vegetables with a low-fat dip as an appetizer instead of chips and dip. ? Sprinkle sunflower seeds or chopped almonds over salads. Or try adding chopped walnuts or almonds to cooked vegetables. ? Try some vegetarian meals using beans and peas. Add garbanzo or kidney beans to salads. Make burritos and tacos with mashed phelps beans or black beans. Where can you learn more? Go to http://www.gonzalez.com/  Enter H967 in the search box to learn more about \"DASH Diet: Care Instructions. \"  Current as of: April 29, 2021               Content Version: 13.0  © 6250-0092 [employer]. Care instructions adapted under license by [employer] (which disclaims liability or warranty for this information). If you have questions about a medical condition or this instruction, always ask your healthcare professional. [address] any warranty or liability for your use of this information.

## 2021-12-27 NOTE — PROGRESS NOTES
Subjective:     Mr. Serge Oseguera returns to the office today in follow-up of multiple medical problems. His blood pressure is currently managed olmesartan. He tolerates this without cough, lower extremity edema or dizziness. Denies headaches, numbness, tingling or focal neurological problems. He has a dyslipidemia currently on Crestor therapy. He tolerates this without muscle soreness or GI upset. He does have a history of left carotid artery stenosis and a history of a TIA for which she is on an aspirin. He denies any exertional chest pains or claudication and has had no strokelike symptoms. Past Medical History:   Diagnosis Date    Bruit of right carotid artery 10/25/2018    Cough 9/13/2017    Dyslipidemia 9/13/2017    Eczema     to hands    Essential hypertension 9/13/2017    Hand eczema 9/13/2017    Herpes zoster without complications 1/37/7615    High cholesterol     History of Guillain-Webster syndrome 9/13/2017    Hx of chest tube placement 1976    Hypertension     Ill-defined condition     polypneumothroax age 24    Long-term use of high-risk medication 9/13/2017    Posterior tibial tendinitis of right leg 9/13/2017    Right ankle pain 9/13/2017    Stenosis of left carotid artery 9/13/2017    TIA (transient ischemic attack) 9/13/2017     Past Surgical History:   Procedure Laterality Date    HX HEENT      septoplasty    HX OTHER SURGICAL      colonoscopy    HX TONSILLECTOMY       Allergies   Allergen Reactions    Influenza Virus Vaccine, Specific Other (comments)     Malou Gutierrez     Current Outpatient Medications   Medication Sig Dispense Refill    rosuvastatin (CRESTOR) 20 mg tablet TAKE 1 TAB BY MOUTH NIGHTLY. 90 Tablet 1    olmesartan (BENICAR) 40 mg tablet Take 1 Tablet by mouth daily. 90 Tablet 1    multivitamin (ONE A DAY) tablet Take 1 Tab by mouth daily.  aspirin delayed-release 81 mg tablet Take 1 Tab by mouth daily.        Social History     Socioeconomic History    Marital status:    Tobacco Use    Smoking status: Never Smoker    Smokeless tobacco: Never Used   Substance and Sexual Activity    Alcohol use: Yes     Alcohol/week: 20.0 standard drinks     Types: 20 Cans of beer per week     Comment: none in 10 days    Drug use: No    Sexual activity: Never     Family History   Problem Relation Age of Onset    No Known Problems Mother     Diabetes Father     Hypertension Father     Cataract Father        Review of Systems:  GEN: no weight loss, weight gain, fatigue or night sweats  CV: no PND, orthopnea, or palpitations  Resp: no dyspnea on exertion, no cough  Abd: no nausea, vomiting or diarrhea  EXT: denies edema, claudication  Endocrine: no hair loss, excessive thirst or polyuria  Neurological ROS: no TIA or stroke symptoms  ROS otherwise negative      Objective:     Visit Vitals  /70 (BP 1 Location: Left arm, BP Patient Position: Sitting, BP Cuff Size: Adult)   Pulse 67   Temp 97.8 °F (36.6 °C) (Temporal)   Resp 16   Ht 6' (1.829 m)   Wt 181 lb 3.2 oz (82.2 kg)   SpO2 100%   BMI 24.58 kg/m²     Body mass index is 24.58 kg/m². General:   alert, cooperative and no distress   Eyes: conjunctivae/sclerae clear. PERRL, EOM's intact   Mouth:  No oral lesions, no pharyngeal erythema, no exudates   Neck: Trachea midline, no thyromegaly, no bruits   Heart: S1 and S2 normal,no murmurs noted    Lungs: Clear to auscultation bilaterally, no increased work of breathing   Abdomen: Soft, nontender. Normal bowel sounds   Extremities: No edema or cyanosis   Neuro: ..alert, oriented x3,speech normal in context and clarity, cranial nerves II-XII intact,motor strength: full proximally and distally,gait: normal  reflexes: full and symmetric     Physical exam otherwise negative         Assessment/Plan:     Diagnoses and all orders for this visit:    Essential hypertension  -     COLLECTION VENOUS BLOOD,VENIPUNCTURE  -     CBC WITH AUTOMATED DIFF;  Future  - METABOLIC PANEL, COMPREHENSIVE; Future  -     URINALYSIS W/ REFLEX CULTURE; Future    Dyslipidemia  -     LIPID PANEL; Future  -     TSH 3RD GENERATION; Future    Long-term use of high-risk medication  -     CK; Future    TIA (transient ischemic attack)    Stenosis of left carotid artery    Prostate cancer screening  -     PSA SCREENING (SCREENING); Future        Other instructions: The patient's medications were reviewed and reconciled a change in his current medical regimen will be made. A no added salt, prudent diet is encouraged. An exercise regimen is endorsed    Await results of multiple labs    Follow-up 6 months    Follow-up and Dispositions    · Return in about 6 months (around 6/27/2022). Amelia Villarreal MD    Please note that this dictation was completed with [employer], the computer voice recognition software. Quite often unanticipated grammatical, syntax, homophones, and other interpretive errors are inadvertently transcribed by the computer software. Please disregard these errors. Please excuse any errors that have escaped final proofreading.

## 2021-12-28 NOTE — PROGRESS NOTES
Please notify patient. Labs stable. No change in current management/meds   Left voice mail  per dr Estrellita Guthrie  results of lab work.

## 2022-02-11 DIAGNOSIS — I10 ESSENTIAL HYPERTENSION: ICD-10-CM

## 2022-02-11 NOTE — TELEPHONE ENCOUNTER
Requested Prescriptions     Pending Prescriptions Disp Refills    olmesartan (BENICAR) 40 mg tablet 90 Tablet 1     Sig: Take 1 Tablet by mouth daily.        Last Refill: 8/24/21  Next Appointment:7/1/22

## 2022-02-13 RX ORDER — OLMESARTAN MEDOXOMIL 40 MG/1
40 TABLET ORAL DAILY
Qty: 90 TABLET | Refills: 0 | Status: SHIPPED | OUTPATIENT
Start: 2022-02-13 | End: 2022-05-12 | Stop reason: SDUPTHER

## 2022-03-18 PROBLEM — B02.9 HERPES ZOSTER WITHOUT COMPLICATIONS: Status: ACTIVE | Noted: 2017-09-13

## 2022-03-18 PROBLEM — R09.89 BRUIT OF RIGHT CAROTID ARTERY: Status: ACTIVE | Noted: 2018-10-25

## 2022-03-18 PROBLEM — I65.22 STENOSIS OF LEFT CAROTID ARTERY: Status: ACTIVE | Noted: 2017-09-13

## 2022-03-18 PROBLEM — Z79.899 LONG-TERM USE OF HIGH-RISK MEDICATION: Status: ACTIVE | Noted: 2017-09-13

## 2022-03-19 PROBLEM — R05.9 COUGH: Status: ACTIVE | Noted: 2017-09-13

## 2022-03-19 PROBLEM — L30.9 HAND ECZEMA: Status: ACTIVE | Noted: 2017-09-13

## 2022-03-19 PROBLEM — E78.5 DYSLIPIDEMIA: Status: ACTIVE | Noted: 2017-09-13

## 2022-03-19 PROBLEM — Z86.69 HISTORY OF GUILLAIN-BARRE SYNDROME: Status: ACTIVE | Noted: 2017-09-13

## 2022-03-19 PROBLEM — M76.821 POSTERIOR TIBIAL TENDINITIS OF RIGHT LEG: Status: ACTIVE | Noted: 2017-09-13

## 2022-03-19 PROBLEM — I10 ESSENTIAL HYPERTENSION: Status: ACTIVE | Noted: 2017-09-13

## 2022-03-19 PROBLEM — G45.9 TIA (TRANSIENT ISCHEMIC ATTACK): Status: ACTIVE | Noted: 2017-09-13

## 2022-03-20 PROBLEM — I65.29 CAROTID STENOSIS, SYMPTOMATIC W/O INFARCT: Status: ACTIVE | Noted: 2017-01-04

## 2022-03-20 PROBLEM — M25.571 RIGHT ANKLE PAIN: Status: ACTIVE | Noted: 2017-09-13

## 2022-03-21 RX ORDER — ROSUVASTATIN CALCIUM 20 MG/1
TABLET, COATED ORAL
Qty: 90 TABLET | Refills: 0 | Status: SHIPPED | OUTPATIENT
Start: 2022-03-21 | End: 2022-06-13 | Stop reason: SDUPTHER

## 2022-03-21 NOTE — TELEPHONE ENCOUNTER
PCP: Elizabeth Park MD    Last appt: 12/27/2021  Future Appointments   Date Time Provider Kate De La O   7/1/2022  8:00 AM William Taylor MD [facility]       Requested Prescriptions     Pending Prescriptions Disp Refills    rosuvastatin (CRESTOR) 20 mg tablet 90 Tablet 1     Sig: TAKE 1 TAB BY MOUTH NIGHTLY.          Other Comments:

## 2022-03-21 NOTE — TELEPHONE ENCOUNTER
Requested Prescriptions     Pending Prescriptions Disp Refills    rosuvastatin (CRESTOR) 20 mg tablet 90 Tablet 1     Sig: TAKE 1 TAB BY MOUTH NIGHTLY.        Last Refill: 9/17/21  Next Appointment:7/1/22

## 2022-05-12 DIAGNOSIS — I10 ESSENTIAL HYPERTENSION: ICD-10-CM

## 2022-05-12 RX ORDER — OLMESARTAN MEDOXOMIL 40 MG/1
40 TABLET ORAL DAILY
Qty: 90 TABLET | Refills: 0 | Status: SHIPPED | OUTPATIENT
Start: 2022-05-12 | End: 2022-07-01 | Stop reason: SDUPTHER

## 2022-05-12 NOTE — TELEPHONE ENCOUNTER
PCP: Yi Mcgowan MD    Last appt: 12/27/2021  Future Appointments   Date Time Provider Kate De La O   7/1/2022  8:00 AM Sohail Bennett MD [facility]       Requested Prescriptions     Pending Prescriptions Disp Refills    olmesartan (BENICAR) 40 mg tablet 90 Tablet 0     Sig: Take 1 Tablet by mouth daily.          Other Comments:

## 2022-05-12 NOTE — TELEPHONE ENCOUNTER
PCP: Xena Carrillo MD    Last appt: 12/27/2021  Future Appointments   Date Time Provider Kate De La O   7/1/2022  8:00 AM Roland Heath MD [facility]       Requested Prescriptions     Pending Prescriptions Disp Refills    olmesartan (BENICAR) 40 mg tablet 90 Tablet 0     Sig: Take 1 Tablet by mouth daily.        Prior labs and Blood pressures:  BP Readings from Last 3 Encounters:   12/27/21 126/70   06/22/21 132/82   12/21/20 124/76     Lab Results   Component Value Date/Time    Sodium 138 12/27/2021 08:47 AM    Potassium 4.7 12/27/2021 08:47 AM    Chloride 108 12/27/2021 08:47 AM    CO2 27 12/27/2021 08:47 AM    Anion gap 3 (L) 12/27/2021 08:47 AM    Glucose 121 (H) 12/27/2021 08:47 AM    BUN 13 12/27/2021 08:47 AM    Creatinine 1.16 12/27/2021 08:47 AM    BUN/Creatinine ratio 11 (L) 12/27/2021 08:47 AM    GFR est AA >60 12/27/2021 08:47 AM    GFR est non-AA >60 12/27/2021 08:47 AM    Calcium 9.0 12/27/2021 08:47 AM     Lab Results   Component Value Date/Time    Hemoglobin A1c 5.7 01/05/2021 08:03 AM     Lab Results   Component Value Date/Time    Cholesterol, total 156 12/27/2021 08:47 AM    HDL Cholesterol 42 12/27/2021 08:47 AM    LDL,Direct 128 (H) 12/24/2016 03:02 AM    LDL, calculated 82.6 12/27/2021 08:47 AM    VLDL, calculated 31.4 12/27/2021 08:47 AM    Triglyceride 157 (H) 12/27/2021 08:47 AM    CHOL/HDL Ratio 3.7 12/27/2021 08:47 AM     No results found for: SHARIFA Hays    Lab Results   Component Value Date/Time    TSH 1.07 12/27/2021 08:47 AM    TSH, 3rd generation 0.82 12/21/2020 08:33 AM

## 2022-06-13 RX ORDER — ROSUVASTATIN CALCIUM 20 MG/1
TABLET, COATED ORAL
Qty: 90 TABLET | Refills: 3 | Status: SHIPPED | OUTPATIENT
Start: 2022-06-13 | End: 2022-07-01 | Stop reason: SDUPTHER

## 2022-06-13 NOTE — TELEPHONE ENCOUNTER
PCP: Osman Richter MD    Last appt: 12/27/2021  Future Appointments   Date Time Provider Kate De La O   7/1/2022  8:00 AM Becky Diamond MD [facility]       Requested Prescriptions     Pending Prescriptions Disp Refills    rosuvastatin (CRESTOR) 20 mg tablet 90 Tablet 0     Sig: TAKE 1 TAB BY MOUTH NIGHTLY.          Other Comments:

## 2022-06-29 PROBLEM — I65.29 CAROTID ARTERY STENOSIS: Status: ACTIVE | Noted: 2017-09-13

## 2022-06-29 NOTE — PROGRESS NOTES
Subjective:     Chief Complaint   Patient presents with   [address] [place] [address] Avenue is a 77 y.o. M. He has a history of hypertension and hypercholesterolemia. I reviewed the medical record. The patient was seen by his primary care provider, Dr. Mikey Uribe, most recently in December. At the time, he was feeling generally well. His blood pressure was being managed with Benicar and he was using rosuvastatin for his dyslipidemia. He was noted to have a history of left-sided carotid artery stenosis and prior transient ischemic attack. Physical examination at the time was unremarkable. No changes were made to his medication regimen and he was referred for routine laboratory testing. Today, the patient comes in for routine preventive care and follow-up on his chronic medical concerns. He reports feeling great overall today and has no significant acute somatic complaints. He does not measure his blood pressure readings at home but thinks that they are typically better controlled when he goes to the doctor. He continues on rosuvastatin for his history of hypercholesterolemia and denies any myalgias or arthralgias or GI symptoms with this medication. He denies any lightheadedness or dizziness with the use of Benicar. No chest pain, shortness of breath, or any further cardiopulmonary symptoms. He continues to be followed by his vascular surgeon, Dr. Juan Francisco Adams, for his history of carotid artery stenosis. He had previously undergone left-sided carotid endarterectomy several years ago. He denies any focal neurological symptoms at the present time. Repeat carotid ultrasound has already been scheduled for October. He has a remote history of Guillain-Barré syndrome, that was initiated by administration of influenza vaccination. We discussed the safety of the PCV 20 vaccine. He does not otherwise appear to have any history of allergies to other vaccinations.   He has had other vaccinations including the COVID-19 vaccination without issues. He continues to be followed by his dermatologist for his history of eczema, with good response with the use of topical corticosteroids. He denies any other rashes, fevers, or other constitutional symptoms at present. Routine Healthcare Maintenance issues are reviewed and discussed with the patient as noted below. Orders to update gaps in healthcare maintenance were placed as noted below in the Assessment and Plan, where applicable. He is due for PCV 20 vaccination as well as repeat colonoscopy versus Cologuard screening; he has not had previous abnormal colonoscopy or Cologuard in the past.  He is willing to get repeat screening today but says he will discuss with his wife regarding insurance coverage for this. His review of systems is otherwise negative. Past Medical History:  Past Medical History:   Diagnosis Date    Carotid artery stenosis 09/13/2017    Eczema     to hands    Essential hypertension 9/13/2017    Hand eczema 9/13/2017    Herpes zoster without complications 1/95/0176    History of chronic cough 09/13/2017    History of echocardiogram 12/2016    LVEF 55%, normal PASP    History of Guillain-[place] syndrome 9/13/2017    History of pneumothorax     polypneumothroax age 24    Hx of chest tube placement 1976    Hyperlipidemia     Hypertension     Long-term use of high-risk medication 9/13/2017    TIA (transient ischemic attack) 9/13/2017       Past Surgical Histor:  Past Surgical History:   Procedure Laterality Date    HX HEENT      septoplasty    HX OTHER SURGICAL      colonoscopy    HX TONSILLECTOMY         Allergies: Allergies   Allergen Reactions    Influenza Virus Vaccine, Specific Other (comments)     Malou Gutierrez       Medications:  Current Outpatient Medications   Medication Sig Dispense Refill    fexofenadine-pseudoephedrine (Allegra-D 12 Hour)  mg Tb12 Take 1 Tablet by mouth every twelve (12) hours.       rosuvastatin (CRESTOR) 20 mg tablet TAKE 1 TAB BY MOUTH NIGHTLY. 90 Tablet 3    olmesartan (BENICAR) 40 mg tablet Take 1 Tablet by mouth daily for 360 days. 90 Tablet 3    sildenafil citrate (VIAGRA) 50 mg tablet Take 1 Tablet by mouth daily as needed for Erectile Dysfunction for up to 360 days. 18 Tablet 11    multivitamin (ONE A DAY) tablet Take 1 Tab by mouth daily.  aspirin delayed-release 81 mg tablet Take 1 Tab by mouth daily. Social History:  Social History     Socioeconomic History    Marital status:    Tobacco Use    Smoking status: Never Smoker    Smokeless tobacco: Never Used   Substance and Sexual Activity    Alcohol use: Yes     Alcohol/week: 20.0 standard drinks     Types: 20 Cans of beer per week     Comment: none in 10 days    Drug use: No    Sexual activity: Never       Family History:  Family History   Problem Relation Age of Onset    No Known Problems Mother     Diabetes Father     Hypertension Father     Cataract Father        Immunizations:  Immunization History   Administered Date(s) Administered    COVID-19, MODERNA BLUE border, Primary or Immunocompromised, (age 18y+), IM, 100 mcg/0.5mL 03/06/2021, 04/03/2021        Healthcare Maintenance:  Health Maintenance   Topic Date Due    DTaP/Tdap/Td series (1 - Tdap) Never done    Shingrix Vaccine Age 50> (1 of 2) Never done    Pneumococcal 65+ years (1 - PCV) Never done    COVID-19 Vaccine (3 - Booster for Moderna series) 09/03/2021    Colorectal Cancer Screening Combo  11/06/2021    Depression Screen  12/27/2022    Lipid Screen  12/27/2022    Hepatitis C Screening  Completed        Review of Systems:  ROS:  Review of Systems   Constitutional: Negative. HENT: Negative. Eyes: Negative. Respiratory: Negative. Cardiovascular: Negative. Gastrointestinal: Negative. Genitourinary: Negative. Musculoskeletal: Negative. Skin: Negative. Neurological: Negative.     Endo/Heme/Allergies: Negative. Psychiatric/Behavioral: Negative. ROS otherwise negative      Objective:     Vital Signs:  Visit Vitals  /82   Pulse 64   Temp 97.8 °F (36.6 °C) (Temporal)   Resp 16   Ht 6' (1.829 m)   Wt 184 lb 12.8 oz (83.8 kg)   SpO2 98%   BMI 25.06 kg/m²       BMI:  Body mass index is 25.06 kg/m². Physical Examination:  Physical Exam  Vitals reviewed. Constitutional:       Appearance: Normal appearance. HENT:      Head: Normocephalic and atraumatic. Nose: Nose normal.      Mouth/Throat:      Mouth: Mucous membranes are moist.   Eyes:      Extraocular Movements: Extraocular movements intact. Conjunctiva/sclera: Conjunctivae normal.      Pupils: Pupils are equal, round, and reactive to light. Cardiovascular:      Rate and Rhythm: Normal rate and regular rhythm. Pulses: Normal pulses. Heart sounds: Normal heart sounds. No murmur heard. No friction rub. No gallop. Pulmonary:      Effort: Pulmonary effort is normal. No respiratory distress. Breath sounds: Normal breath sounds. No wheezing, rhonchi or rales. Abdominal:      General: Bowel sounds are normal. There is no distension. Palpations: Abdomen is soft. There is no mass. Tenderness: There is no abdominal tenderness. There is no guarding or rebound. Musculoskeletal:         General: No tenderness, deformity or signs of injury. Normal range of motion. Cervical back: Normal range of motion and neck supple. Right lower leg: No edema. Left lower leg: No edema. Skin:     General: Skin is warm and dry. Findings: No bruising, lesion or rash. Neurological:      General: No focal deficit present. Mental Status: He is alert and oriented to person, place, and time. Mental status is at baseline. Cranial Nerves: No cranial nerve deficit. Sensory: No sensory deficit. Motor: No weakness.       Coordination: Coordination normal.      Gait: Gait normal.      Deep Tendon Reflexes: Reflexes normal.   Psychiatric:         Mood and Affect: Mood normal.         Behavior: Behavior normal.          Physical exam otherwise negative    Diagnostic Testing:    Laboratory Studies:  Office Visit on 12/27/2021   Component Date Value Ref Range Status    Prostate Specific Ag 12/27/2021 1.4  0.01 - 4.0 ng/mL Final    Comment: Method used is Zoomph  (NOTE)  Many types of test methods are used to measure PSA and can yield   different results on any given specimen. Therefore PSA results from   different laboratories on the same patient are not directly   comparable. In addition, PSA values by themselves should not be   interpreted as the presence or absence of cancer. PSA values used to   monitor for biochemical recurrence of prostate cancer should be   interpreted in accordance with current clinical guidelines (e.g. the   2013 American Urological Association (AUA) guidelines and the 2015    Association of Urology (EAU) guidelines).       Color 12/27/2021 YELLOW/STRAW    Final    Color Reference Range: Straw, Yellow or Dark Yellow    Appearance 12/27/2021 CLEAR  CLEAR   Final    Specific gravity 12/27/2021 1.012  1.003 - 1.030   Final    pH (UA) 12/27/2021 5.0  5.0 - 8.0   Final    Protein 12/27/2021 Negative  Negative mg/dL Final    Glucose 12/27/2021 Negative  Negative mg/dL Final    Ketone 12/27/2021 Negative  Negative mg/dL Final    Bilirubin 12/27/2021 Negative  Negative   Final    Blood 12/27/2021 Negative  Negative   Final    Urobilinogen 12/27/2021 0.2  0.2 - 1.0 EU/dL Final    Nitrites 12/27/2021 Negative  Negative   Final    Leukocyte Esterase 12/27/2021 Negative  Negative   Final    UA:UC IF INDICATED 12/27/2021 CULTURE NOT INDICATED BY UA RESULT  CULTURE NOT INDICATED BY UA RESULT   Final    WBC 12/27/2021 0-4  0 - 4 /hpf Final    RBC 12/27/2021 0-5  0 - 5 /hpf Final    Epithelial cells 12/27/2021 FEW  FEW /lpf Final    Comment: Epithelial cell category consists of squamous cells and /or transitional  urothelial cells. Renal tubular cells, if present, are separately identified as  such.  Bacteria 12/27/2021 Negative  Negative /hpf Final    Hyaline cast 12/27/2021 0-2  0 - 5 /lpf Final    TSH 12/27/2021 1.07  0.36 - 3.74 uIU/mL Final    Comment:   Due to TSH heterogeneity, both structurally and degree of glycosylation,  monoclonal antibodies used in the TSH assay may not accurately quantitate TSH. Therefore, this result should be correlated with clinical findings as well as  with other assessments of thyroid function, e.g., free T4, free T3.  Sodium 12/27/2021 138  136 - 145 mmol/L Final    Potassium 12/27/2021 4.7  3.5 - 5.1 mmol/L Final    Chloride 12/27/2021 108  97 - 108 mmol/L Final    CO2 12/27/2021 27  21 - 32 mmol/L Final    Anion gap 12/27/2021 3* 5 - 15 mmol/L Final    Glucose 12/27/2021 121* 65 - 100 mg/dL Final    BUN 12/27/2021 13  6 - 20 MG/DL Final    Creatinine 12/27/2021 1.16  0.70 - 1.30 MG/DL Final    BUN/Creatinine ratio 12/27/2021 11* 12 - 20   Final    GFR est AA 12/27/2021 >60  >60 ml/min/1.73m2 Final    GFR est non-AA 12/27/2021 >60  >60 ml/min/1.73m2 Final    Comment: Estimated GFR is calculated using the IDMS-traceable Modification of Diet in  Renal Disease (MDRD) Study equation, reported for both  Americans  (GFRAA) and non- Americans (GFRNA), and normalized to 1.73m2 body  surface area. The physician must decide which value applies to the patient.  Calcium 12/27/2021 9.0  8.5 - 10.1 MG/DL Final    Bilirubin, total 12/27/2021 0.8  0.2 - 1.0 MG/DL Final    ALT (SGPT) 12/27/2021 30  12 - 78 U/L Final    AST (SGOT) 12/27/2021 21  15 - 37 U/L Final    Alk.  phosphatase 12/27/2021 88  45 - 117 U/L Final    Protein, total 12/27/2021 7.0  6.4 - 8.2 g/dL Final    Albumin 12/27/2021 4.2  3.5 - 5.0 g/dL Final    Globulin 12/27/2021 2.8  2.0 - 4.0 g/dL Final    A-G Ratio 12/27/2021 1.5  1.1 - 2.2   Final    Cholesterol, total 12/27/2021 156  <200 MG/DL Final    Triglyceride 12/27/2021 157* <150 MG/DL Final    Comment: Based on NCEP-ATP III:  Triglycerides <150 mg/dL  is considered normal, 150-199  mg/dL  borderline high,  200-499 mg/dL high and  greater than or equal to 500  mg/dL very high.  HDL Cholesterol 12/27/2021 42  MG/DL Final    Comment: Based on NCEP ATP III, HDL Cholesterol <40 mg/dL is considered low and >60  mg/dL is elevated.  LDL, calculated 12/27/2021 82.6  0 - 100 MG/DL Final    Comment: Based on the NCEP-ATP: LDL-C concentrations are considered  optimal <100 mg/dL,  near optimal/above Normal 100-129 mg/dL Borderline High: 130-159, High: 160-189  mg/dL Very High: Greater than or equal to 190 mg/dL      VLDL, calculated 12/27/2021 31.4  MG/DL Final    CHOL/HDL Ratio 12/27/2021 3.7  0.0 - 5.0   Final    CK 12/27/2021 54  39 - 308 U/L Final    WBC 12/27/2021 5.3  4.1 - 11.1 K/uL Final    RBC 12/27/2021 4.79  4.10 - 5.70 M/uL Final    HGB 12/27/2021 15.5  12.1 - 17.0 g/dL Final    HCT 12/27/2021 44.7  36.6 - 50.3 % Final    MCV 12/27/2021 93.3  80.0 - 99.0 FL Final    MCH 12/27/2021 32.4  26.0 - 34.0 PG Final    MCHC 12/27/2021 34.7  30.0 - 36.5 g/dL Final    RDW 12/27/2021 13.0  11.5 - 14.5 % Final    PLATELET 96/23/2147 054  150 - 400 K/uL Final    MPV 12/27/2021 11.5  8.9 - 12.9 FL Final    NRBC 12/27/2021 0.0  0  WBC Final    ABSOLUTE NRBC 12/27/2021 0.00  0.00 - 0.01 K/uL Final    NEUTROPHILS 12/27/2021 59  32 - 75 % Final    LYMPHOCYTES 12/27/2021 23  12 - 49 % Final    MONOCYTES 12/27/2021 14* 5 - 13 % Final    EOSINOPHILS 12/27/2021 3  0 - 7 % Final    BASOPHILS 12/27/2021 1  0 - 1 % Final    IMMATURE GRANULOCYTES 12/27/2021 0  0.0 - 0.5 % Final    ABS. NEUTROPHILS 12/27/2021 3.1  1.8 - 8.0 K/UL Final    ABS. LYMPHOCYTES 12/27/2021 1.2  0.8 - 3.5 K/UL Final    ABS. MONOCYTES 12/27/2021 0.8  0.0 - 1.0 K/UL Final    ABS.  EOSINOPHILS 12/27/2021 0.1  0.0 - 0.4 K/UL Final    ABS. BASOPHILS 12/27/2021 0.0  0.0 - 0.1 K/UL Final    ABS. IMM. GRANS. 12/27/2021 0.0  0.00 - 0.04 K/UL Final    DF 12/27/2021 AUTOMATED    Final         Radiographic Studies:  XR Results (most recent):  Results from [facility] encounter on 02/27/10    XR FINGERS MINIMUM 2 VIEWS    Narrative      ICD Codes / Adm. Diagnosis:    /   LACERATION  Examination:  FINGER MIN 2 VWS R - [id number] - Feb 27 2010  5:11PM  Accession No:  [id number]  Reason:  REASON: INJURY      REPORT:  Three-view exam of the right fingers with attention to the ring finger shows  linear nondisplaced fracture limited to the tuft of the distal phalanx of  the ring finger. No foreign bodies seen. IMPRESSION: Nondisplaced hairline fracture tuft distal phalanx right ring  finger. Interpreting/Reading Doctor: Kinjal Arita ([id number])  Transcribed: n/a on 02/27/2010  Approved: Knijal Arita ([id number])  02/27/2010        Distribution:  Attending Doctor: Naa Garcia Doctor: Savannah MORENO Results (most recent):  No results found for this or any previous visit. CT Results (most recent):  Results from Hospital Encounter encounter on 12/23/16    CT HEAD WO CONT    Narrative  EXAM:  CT HEAD WO CONT  Clinical history: Altered mental status, possible TIA  INDICATION:   TIA    COMPARISON: None. TECHNIQUE: Unenhanced CT of the head was performed using 5 mm images. Brain and  bone windows were generated. CT dose reduction was achieved through use of a  standardized protocol tailored for this examination and automatic exposure  control for dose modulation. FINDINGS:  The ventricles and sulci are normal in size, shape and configuration and  midline. There is no significant white matter disease. There is no intracranial  hemorrhage, extra-axial collection, mass, mass effect or midline shift. The  basilar cisterns are open. No acute infarct is identified.  The bone windows  demonstrate no abnormalities. The visualized portions of the paranasal sinuses  and mastoid air cells are clear. Impression  IMPRESSION:    Normal CT scan of the head. DEXA Results (most recent):  No results found for this or any previous visit. MRI Results (most recent):  Results from [facility] encounter on 12/23/16    MRI BRAIN W WO CONT    Narrative  CLINICAL HISTORY: Orthostatic hypotension and dizziness    INDICATION: . Orthostatic hypotension and dizziness      COMPARISON: None    TECHNIQUE: MR examination of the brain includes axial and sagittal T1  pre-and-post contrast, axial T2, axial FLAIR, axial gradient echo, axial DWI,  coronal T1 postcontrast.    Contrast: The patient was administered gadolinium-based contrast material axial  and coronal T1-weighted postcontrast enhanced imaging was obtained. FINDINGS:  There are minimal scattered foci of increased T2 signal intensity in the  periventricular white matter and also in the subcortical centrum semiovale  corona radiata. There is mild sulcal and ventricular prominence. Minimal chronic  hemosiderin deposition in the periventricular white matter on the right. There is no intracranial mass, hemorrhage or evidence of acute infarction. There is no Chiari or syrinx. The pituitary and infundibulum are grossly  unremarkable. There is no skull base mass. Cerebellopontine angles are grossly  unremarkable. The major intracranial vascular flow-voids are unremarkable. No  evidence of demyelinating process. There is no abnormal parenchymal enhancement. There is no abnormal meningeal  enhancement. The cavernous sinuses are symmetric. Optic chiasm and infundibulum  grossly unremarkable. Orbits are grossly symmetric. Dural venous sinuses are  grossly patent. The brain architecture is normal. There is no evidence of midline shift or  mass-effect. There are no extra-axial fluid collections.     The mastoid air cells are well pneumatized and clear. Minimal frontal sinus disease. Impression  IMPRESSION:    No intracranial mass, hemorrhage or evidence of acute infarction. Mild chronic microvascular ischemic change and cerebral atrophy for patient age. Assessment/Plan:       ICD-10-CM ICD-9-CM    1. Routine adult health maintenance  Z00.00 V70.0 HEMOGLOBIN A1C WITH EAG      CBC WITH AUTOMATED DIFF      METABOLIC PANEL, COMPREHENSIVE      LIPID PANEL      MICROALBUMIN, UR, RAND W/ MICROALB/CREAT RATIO   2. Essential hypertension  I10 401.9 olmesartan (BENICAR) 40 mg tablet   3. Dyslipidemia  E78.5 272.4 rosuvastatin (CRESTOR) 20 mg tablet   4. TIA (transient ischemic attack)  G45.9 435.9    5. Bilateral carotid artery stenosis  I65.23 433.10      433.30    6. Encounter for immunization  Z23 V03.89 ADMIN PNEUMOCOCCAL VACCINE      PNEUMOCOCCAL, PCV20, PREVNAR 20, (AGE 18 YRS+), IM, PF   7. Screen for colon cancer  Z12.11 V76.51 COLOGUARD TEST (FECAL DNA COLORECTAL CANCER SCREENING)          Healthcare Maintenance:  - Preventive measures are reviewed as per above  - Up to date on routine interventions except as noted above  - Orders placed to update gaps as noted  - Notes: PCV 20 reasonable to administer today; advised patient to monitor for any potential complications associated with the vaccine. Cologuard is also ordered. Fasting labs ordered for completion in the fall. Carotid Artery Stenosis:   - Location: bilateral    - Last Imaging: ---   - Plaque Percent: not listed   - Previous interventions: s/p L CEA   - Plaque Severity: not defined   - Life Expectancy > 5 years? YES   - Yearly ultrasound ordered? YES   - Statin treatment: YES   - Antithrombotic Therapy: YES   - Blood pressure management: YES   - Smoking Cessation Counseling Done: N/A   - Other therapeutic Lifestyle Changes counseled: YES   - Follows with vascular surgery, repeat carotid ultrasound pending in October.     Essential Hypertension/Blood Pressure Management:   - Home BP Readings: not doing   - Current Control: optimal   - Target BP: Less than 130/80 mmHg   - Relevant BP Meds:  Key CAD CHF Meds             rosuvastatin (CRESTOR) 20 mg tablet (Taking) TAKE 1 TAB BY MOUTH NIGHTLY. olmesartan (BENICAR) 40 mg tablet (Taking) Take 1 Tablet by mouth daily for 360 days. aspirin delayed-release 81 mg tablet (Taking) Take 1 Tab by mouth daily.             - Plan: continue current treatment regimen, continue current meds, dietary sodium restriction, regular aerobic exercise, weight loss   - Notes: Tolerating therapy, lower blood pressure target given history of vascular disease and TIA    Hyperlipidemia/Dyslipidemia:   - Summary of Cardiovascular Risks and Goals:     LDL goal is under 80  hypertension  hyperlipidemia  prior CVA/TIA or known carotid artery disease   -   Lab Results   Component Value Date/Time    LDL, calculated 82.6 12/27/2021 08:47 AM    HDL Cholesterol 42 12/27/2021 08:47 AM       - Relevant Cholesterol Meds:  Key Antihyperlipidemia Meds             rosuvastatin (CRESTOR) 20 mg tablet (Taking) TAKE 1 TAB BY MOUTH NIGHTLY. - Cholesterol at target: yes   - Does patient meet USPSTF and ACC/AHA indications for pharmacotherapy (e.g., statin): yes   - GI symptoms with meds: NO   - Muscle aches with meds: NO   - Other Adverse effects with meds: NO   - Medication Plan: continue   - Notes: Tolerating therapy, aggressive target secondary to history of TIA and vascular disease          Leonardo Reyes MD    Please note that this dictation was completed with [employer], the computer voice recognition software. Quite often unanticipated grammatical, syntax, homophones, and other interpretive errors are inadvertently transcribed by the computer software. Please disregard these errors. Please excuse any errors that have escaped final proofreading.      This was a complex patient and total appointment time was at least 40 minutes, to include:  - review of medical record  - history gathering, physical examination, and review of systems  - medication reconciliation  - medical decision-making  - counseling on the plan of care

## 2022-07-01 ENCOUNTER — OFFICE VISIT (OUTPATIENT)
Dept: INTERNAL MEDICINE CLINIC | Age: 67
End: 2022-07-01
Payer: COMMERCIAL

## 2022-07-01 VITALS
HEART RATE: 64 BPM | OXYGEN SATURATION: 98 % | RESPIRATION RATE: 16 BRPM | DIASTOLIC BLOOD PRESSURE: 82 MMHG | TEMPERATURE: 97.8 F | SYSTOLIC BLOOD PRESSURE: 130 MMHG | BODY MASS INDEX: 25.03 KG/M2 | HEIGHT: 72 IN | WEIGHT: 184.8 LBS

## 2022-07-01 DIAGNOSIS — Z12.11 SCREEN FOR COLON CANCER: ICD-10-CM

## 2022-07-01 DIAGNOSIS — I65.23 BILATERAL CAROTID ARTERY STENOSIS: ICD-10-CM

## 2022-07-01 DIAGNOSIS — Z23 ENCOUNTER FOR IMMUNIZATION: ICD-10-CM

## 2022-07-01 DIAGNOSIS — E78.5 DYSLIPIDEMIA: ICD-10-CM

## 2022-07-01 DIAGNOSIS — I10 ESSENTIAL HYPERTENSION: ICD-10-CM

## 2022-07-01 DIAGNOSIS — Z00.00 ROUTINE ADULT HEALTH MAINTENANCE: Primary | ICD-10-CM

## 2022-07-01 DIAGNOSIS — G45.9 TIA (TRANSIENT ISCHEMIC ATTACK): ICD-10-CM

## 2022-07-01 PROCEDURE — 99397 PER PM REEVAL EST PAT 65+ YR: CPT | Performed by: INTERNAL MEDICINE

## 2022-07-01 PROCEDURE — 90471 IMMUNIZATION ADMIN: CPT | Performed by: INTERNAL MEDICINE

## 2022-07-01 PROCEDURE — 90677 PCV20 VACCINE IM: CPT | Performed by: INTERNAL MEDICINE

## 2022-07-01 PROCEDURE — 99215 OFFICE O/P EST HI 40 MIN: CPT | Performed by: INTERNAL MEDICINE

## 2022-07-01 RX ORDER — FEXOFENADINE HCL AND PSEUDOEPHEDRINE HCI 60; 120 MG/1; MG/1
1 TABLET, EXTENDED RELEASE ORAL EVERY 12 HOURS
COMMUNITY

## 2022-07-01 RX ORDER — SILDENAFIL 50 MG/1
50 TABLET, FILM COATED ORAL
Qty: 18 TABLET | Refills: 11 | Status: SHIPPED | OUTPATIENT
Start: 2022-07-01 | End: 2023-06-26

## 2022-07-01 RX ORDER — OLMESARTAN MEDOXOMIL 40 MG/1
40 TABLET ORAL DAILY
Qty: 90 TABLET | Refills: 3 | Status: SHIPPED | OUTPATIENT
Start: 2022-07-01 | End: 2023-06-26

## 2022-07-01 RX ORDER — ROSUVASTATIN CALCIUM 20 MG/1
TABLET, COATED ORAL
Qty: 90 TABLET | Refills: 3 | Status: SHIPPED | OUTPATIENT
Start: 2022-07-01

## 2022-07-01 RX ORDER — SILDENAFIL 50 MG/1
50 TABLET, FILM COATED ORAL
COMMUNITY
End: 2022-07-01 | Stop reason: SDUPTHER

## 2022-07-01 NOTE — PATIENT INSTRUCTIONS
Please continue monitoring your blood pressure at home using your home blood pressure monitor. Please record your readings and bring these with you to your next visit. DASH Diet: Care Instructions  Your Care Instructions     The DASH diet is an eating plan that can help lower your blood pressure. DASH stands for Dietary Approaches to Stop Hypertension. Hypertension is high blood pressure. The DASH diet focuses on eating foods that are high in calcium, potassium, and magnesium. These nutrients can lower blood pressure. The foods that are highest in these nutrients are fruits, vegetables, low-fat dairy products, nuts, seeds, and legumes. But taking calcium, potassium, and magnesium supplements instead of eating foods that are high in those nutrients does not have the same effect. The DASH diet also includes whole grains, fish, and poultry. The DASH diet is one of several lifestyle changes your doctor may recommend to lower your high blood pressure. Your doctor may also want you to decrease the amount of sodium in your diet. Lowering sodium while following the DASH diet can lower blood pressure even further than just the DASH diet alone. Follow-up care is a key part of your treatment and safety. Be sure to make and go to all appointments, and call your doctor if you are having problems. It's also a good idea to know your test results and keep a list of the medicines you take. How can you care for yourself at home? Following the DASH diet  · Eat 4 to 5 servings of fruit each day. A serving is 1 medium-sized piece of fruit, ½ cup chopped or canned fruit, 1/4 cup dried fruit, or 4 ounces (½ cup) of fruit juice. Choose fruit more often than fruit juice. · Eat 4 to 5 servings of vegetables each day. A serving is 1 cup of lettuce or raw leafy vegetables, ½ cup of chopped or cooked vegetables, or 4 ounces (½ cup) of vegetable juice. Choose vegetables more often than vegetable juice.   · Get 2 to 3 servings of low-fat and fat-free dairy each day. A serving is 8 ounces of milk, 1 cup of yogurt, or 1 ½ ounces of cheese. · Eat 6 to 8 servings of grains each day. A serving is 1 slice of bread, 1 ounce of dry cereal, or ½ cup of cooked rice, pasta, or cooked cereal. Try to choose whole-grain products as much as possible. · Limit lean meat, poultry, and fish to 2 servings each day. A serving is 3 ounces, about the size of a deck of cards. · Eat 4 to 5 servings of nuts, seeds, and legumes (cooked dried beans, lentils, and split peas) each week. A serving is 1/3 cup of nuts, 2 tablespoons of seeds, or ½ cup of cooked beans or peas. · Limit fats and oils to 2 to 3 servings each day. A serving is 1 teaspoon of vegetable oil or 2 tablespoons of salad dressing. · Limit sweets and added sugars to 5 servings or less a week. A serving is 1 tablespoon jelly or jam, ½ cup sorbet, or 1 cup of lemonade. · Eat less than 2,300 milligrams (mg) of sodium a day. If you limit your sodium to 1,500 mg a day, you can lower your blood pressure even more. · Be aware that all of these are the suggested number of servings for people who eat 1,800 to 2,000 calories a day. Your recommended number of servings may be different if you need more or fewer calories. Tips for success  · Start small. Do not try to make dramatic changes to your diet all at once. You might feel that you are missing out on your favorite foods and then be more likely to not follow the plan. Make small changes, and stick with them. Once those changes become habit, add a few more changes. · Try some of the following:  ? Make it a goal to eat a fruit or vegetable at every meal and at snacks. This will make it easy to get the recommended amount of fruits and vegetables each day. ? Try yogurt topped with fruit and nuts for a snack or healthy dessert. ? Add lettuce, tomato, cucumber, and onion to sandwiches.   ? Combine a ready-made pizza crust with low-fat mozzarella cheese and lots of vegetable toppings. Try using tomatoes, squash, spinach, broccoli, carrots, cauliflower, and onions. ? Have a variety of cut-up vegetables with a low-fat dip as an appetizer instead of chips and dip. ? Sprinkle sunflower seeds or chopped almonds over salads. Or try adding chopped walnuts or almonds to cooked vegetables. ? Try some vegetarian meals using beans and peas. Add garbanzo or kidney beans to salads. Make burritos and tacos with mashed phelps beans or black beans. Where can you learn more? Go to http://www.gonzalez.com/  Enter H967 in the search box to learn more about \"DASH Diet: Care Instructions. \"  Current as of: January 10, 2022               Content Version: 13.2  © 9327-7171 [employer]. Care instructions adapted under license by [employer] (which disclaims liability or warranty for this information). If you have questions about a medical condition or this instruction, always ask your healthcare professional. [place] [address] any warranty or liability for your use of this information. Colon Cancer Screening: Care Instructions  Overview     Colorectal cancer occurs in the colon or rectum. That's the lower part of your digestive system. It often starts in small growths called polyps in the colon or rectum. Polyps are usually found with screening tests. Depending on the type of test, any polyps found may be removed during the tests. Colorectal cancer usually does not cause symptoms at first. But regular tests can help find it early, before it spreads and becomes harder to treat. Your risk for colorectal cancer gets higher as you get older. Experts recommend starting screening at age 39 for people who are at average risk. Talk with your doctor about your risk and when to start and stop screening. You may have one of several tests. Follow-up care is a key part of your treatment and safety.  Be sure to make and go to all appointments, and call your doctor if you are having problems. It's also a good idea to know your test results and keep a list of the medicines you take. What are the main screening tests for colon cancer? The screening tests are:  Stool tests. These include the guaiac fecal occult blood test (gFOBT), the fecal immunochemical test (FIT), and the combined fecal immunochemical test and stool DNA test (FIT-DNA). These tests check stool samples for signs of cancer. If your test is positive, you will need to have a colonoscopy. Sigmoidoscopy. This test lets your doctor look at the lining of your rectum and the lowest part of your colon. Your doctor uses a lighted tube called a sigmoidoscope. This test can't find cancers or polyps in the upper part of your colon. In some cases, polyps that are found can be removed. But if your doctor finds polyps, you will need to have a colonoscopy to check the upper part of your colon. Colonoscopy. This test lets your doctor look at the lining of your rectum and your entire colon. The doctor uses a thin, flexible tool called a colonoscope. It can also be used to remove polyps or get a tissue sample (biopsy). A less common test is CT colonography (CTC). It's also called virtual colonoscopy. Who should be screened for colorectal cancer? Your risk for colorectal cancer gets higher as you get older. Experts recommend starting screening at age 39 for people who are at average risk. Talk with your doctor about your risk and when to start and stop screening. How often you need screening depends on the type of test you get:  Stool tests. Every year for FIT or gFOBT. Every 1 to 3 years for sDNA, also called FIT-DNA. Tests that look inside the colon. Every 5 years for sigmoidoscopy. (If you do the FIT test every year, you can get this test every 10 years.)  Every 5 years for CT colonography (virtual colonoscopy). Every 10 years for colonoscopy.   Experts agree that people at higher risk may need to be tested sooner and more often. This includes people who have a strong family history of colon cancer. Talk to your doctor about which test is best for you and when to be tested. When should you call for help? Watch closely for changes in your health, and be sure to contact your doctor if:    · You have any changes in your bowel habits.     · You have any problems. Where can you learn more? Go to http://www.gray.com/  Enter M541 in the search box to learn more about \"Colon Cancer Screening: Care Instructions. \"  Current as of: September 8, 2021               Content Version: 13.2  © 8277-8119 [employer]. Care instructions adapted under license by [employer] (which disclaims liability or warranty for this information). If you have questions about a medical condition or this instruction, always ask your healthcare professional. [address] any warranty or liability for your use of this information.

## 2022-07-01 NOTE — PROGRESS NOTES
Chief Complaint   Patient presents with   Yared Salm Establish Care     Visit Vitals  /82   Pulse 64   Temp 97.8 °F (36.6 °C) (Temporal)   Resp 16   Ht 6' (1.829 m)   Wt 184 lb 12.8 oz (83.8 kg)   SpO2 98%   BMI 25.06 kg/m²       1. \"Have you been to the ER, urgent care clinic since your last visit? Hospitalized since your last visit? \" No    2. \"Have you seen or consulted any other health care providers outside of the [address] [place] [address] since your last visit? \" No     3. For patients aged 39-70: Has the patient had a colonoscopy / FIT/ Cologuard? No      If the patient is female:    4. For patients aged 41-77: Has the patient had a mammogram within the past 2 years? No      5. For patients aged 21-65: Has the patient had a pap smear?  No

## 2022-07-05 NOTE — PROGRESS NOTES
After obtaining written consent and per orders of Dr. Rema Irvin, injection of [id number] given by Jossy Ospina, [address] [place], [place] [address]fernando. Order and injection/medication verified by Estella Kenny. Patient tolerated procedure well. VIS was given to them. No reactions noted.

## 2022-12-28 NOTE — PROGRESS NOTES
ICD-10-CM ICD-9-CM    1. Routine adult health maintenance  Z00.00 V70.0 CBC WITH AUTOMATED DIFF      2. Essential hypertension  Y94 238.6 METABOLIC PANEL, COMPREHENSIVE      MICROALBUMIN, UR, RAND W/ MICROALB/CREAT RATIO      3. Dyslipidemia  E78.5 272.4 LIPID PANEL      4. TIA (transient ischemic attack)  G45.9 435.9       5. Bilateral carotid artery stenosis  I65.23 433.10      433.30       6. Screening for colon cancer  Z12.11 V76.51 COLOGUARD TEST (FECAL DNA COLORECTAL CANCER SCREENING)               Subjective:     Chief Complaint   Patient presents with    Follow-up       Angelo Lockhart is a 79 y.o. M. He has a past medical history of carotid artery stenosis and essential hypertension among other medical problems. I reviewed and updated the medical record. I saw this patient most recently in July for routine preventive care and follow-up on his chronic medical concerns. He was without complaints at the time. He was using rosuvastatin for a history of hypercholesterolemia without any issues, and was using Benicar for treatment of his hypertension. He was noted to be following with a vascular surgeon for his history of carotid artery stenosis. Physical examination at the time had been generally unremarkable. He was referred for routine laboratory testing. He was continued on Benicar and rosuvastatin. Today, the patient comes in for routine follow-up. He reports feeling great overall today and has no significant acute somatic complaints. He only recently got onto Medicare, and says that he would like to get his Cologuard screening done now that it is covered. He reports that his vascular surgeon has recently performed a repeat carotid ultrasound given his prior history of carotid artery stenosis status post previous left-sided endarterectomy, and that overall his plaque is felt to be stable.   He denies any chest pain, shortness breath, or any focal neurological symptoms or other cardiopulmonary symptoms. He continues on rosuvastatin 20 mg daily, with his last LDL checked last year being at target as reviewed below, and without any side effects associate with any of his medications. He continues on Benicar for his blood pressure and reports tolerating this well without lightheadedness or dizziness. We discussed his vaccinations, and reports as usual that he cannot get the influenza vaccine secondary to allergies, but is willing to get the COVID-19 booster. His review of systems is otherwise negative. Routine Healthcare Maintenance issues are reviewed and discussed with the patient as noted below. Orders to update gaps in healthcare maintenance were placed as noted below in the Assessment and Plan, where applicable. Past Medical History:  Past Medical History:   Diagnosis Date    Carotid artery stenosis 09/13/2017    Eczema     to hands    Essential hypertension 9/13/2017    Hand eczema 9/13/2017    Herpes zoster without complications 5/11/6562    History of chronic cough 09/13/2017    History of echocardiogram 12/2016    LVEF 55%, normal PASP    History of Guillain-Wallace syndrome 9/13/2017    History of pneumothorax     polypneumothroax age 24    Hx of chest tube placement 1976    Hyperlipidemia     Hypertension     Impaired glucose tolerance     Long-term use of high-risk medication 9/13/2017    TIA (transient ischemic attack) 9/13/2017       Past Surgical Histor:  Past Surgical History:   Procedure Laterality Date    HX CAROTID ENDARTERECTOMY Left 2017    HX HEENT      septoplasty    HX OTHER SURGICAL      colonoscopy    HX TONSILLECTOMY         Allergies: Allergies   Allergen Reactions    Influenza Virus Vaccine, Specific Other (comments)     Malou Gutierrez       Medications:  Current Outpatient Medications   Medication Sig Dispense Refill    acetaminophen/diphenhydramine (TYLENOL PM PO) Take  by mouth.       fexofenadine-pseudoephedrine (ALLEGRA-D)  mg Tb12 Take 1 Tablet by mouth every twelve (12) hours. rosuvastatin (CRESTOR) 20 mg tablet TAKE 1 TAB BY MOUTH NIGHTLY. 90 Tablet 3    olmesartan (BENICAR) 40 mg tablet Take 1 Tablet by mouth daily for 360 days. 90 Tablet 3    sildenafil citrate (VIAGRA) 50 mg tablet Take 1 Tablet by mouth daily as needed for Erectile Dysfunction for up to 360 days. 18 Tablet 11    multivitamin (ONE A DAY) tablet Take 1 Tab by mouth daily. aspirin delayed-release 81 mg tablet Take 1 Tab by mouth daily. Social History:  Social History     Socioeconomic History    Marital status:    Tobacco Use    Smoking status: Never    Smokeless tobacco: Never   Substance and Sexual Activity    Alcohol use: Yes     Alcohol/week: 20.0 standard drinks     Types: 20 Cans of beer per week     Comment: none in 10 days    Drug use: No    Sexual activity: Never       Family History:  Family History   Problem Relation Age of Onset    No Known Problems Mother     Diabetes Father     Hypertension Father     Cataract Father        Immunizations:  Immunization History   Administered Date(s) Administered    COVID-19, MODERNA BLUE border, Primary or Immunocompromised, (age 18y+), IM, 100 mcg/0.5mL 03/06/2021, 04/03/2021    COVID-19, MODERNA Booster BLUE border, (age 18y+), IM, 50mcg/0.25mL 12/01/2021    Pneumococcal Conjugate PCV20, PF (Prevnar 20) 07/05/2022        Healthcare Maintenance:  Health Maintenance   Topic Date Due    DTaP/Tdap/Td series (1 - Tdap) Never done    Shingles Vaccine (1 of 2) Never done    Colorectal Cancer Screening Combo  11/06/2021    COVID-19 Vaccine (4 - Booster for Moderna series) 01/26/2022    Depression Screen  12/27/2022    Lipid Screen  12/27/2022    Hepatitis C Screening  Completed    Pneumococcal 65+ years  Completed        Review of Systems:  ROS:  Review of Systems   Constitutional: Negative. HENT: Negative. Eyes: Negative. Respiratory: Negative. Cardiovascular: Negative. Gastrointestinal: Negative.     Genitourinary: Negative. Musculoskeletal: Negative. Skin: Negative. Neurological: Negative. Endo/Heme/Allergies: Negative. Psychiatric/Behavioral: Negative. ROS otherwise negative      Objective:     Vital Signs:  Visit Vitals  /78 (BP 1 Location: Left upper arm, BP Patient Position: Sitting, BP Cuff Size: Adult)   Pulse 65   Resp 16   Ht 6' (1.829 m)   Wt 185 lb 11.2 oz (84.2 kg)   SpO2 97%   BMI 25.19 kg/m²       BMI:  Body mass index is 25.19 kg/m². Physical Examination:  Physical Exam  Vitals reviewed. Constitutional:       Appearance: Normal appearance. HENT:      Head: Normocephalic and atraumatic. Nose: Nose normal.      Mouth/Throat:      Mouth: Mucous membranes are moist.   Eyes:      Extraocular Movements: Extraocular movements intact. Conjunctiva/sclera: Conjunctivae normal.      Pupils: Pupils are equal, round, and reactive to light. Cardiovascular:      Rate and Rhythm: Normal rate and regular rhythm. Pulses: Normal pulses. Heart sounds: Normal heart sounds. No murmur heard. No friction rub. No gallop. Pulmonary:      Effort: Pulmonary effort is normal. No respiratory distress. Breath sounds: Normal breath sounds. No wheezing, rhonchi or rales. Abdominal:      General: Bowel sounds are normal. There is no distension. Palpations: Abdomen is soft. There is no mass. Tenderness: There is no abdominal tenderness. There is no guarding or rebound. Musculoskeletal:         General: No tenderness, deformity or signs of injury. Normal range of motion. Cervical back: Normal range of motion and neck supple. Right lower leg: No edema. Left lower leg: No edema. Skin:     General: Skin is warm and dry. Findings: No bruising, lesion or rash. Neurological:      General: No focal deficit present. Mental Status: He is alert and oriented to person, place, and time. Mental status is at baseline.       Cranial Nerves: No cranial nerve deficit. Sensory: No sensory deficit. Motor: No weakness. Coordination: Coordination normal.      Gait: Gait normal.      Deep Tendon Reflexes: Reflexes normal.   Psychiatric:         Mood and Affect: Mood normal.         Behavior: Behavior normal.        Physical exam otherwise negative    Diagnostic Testing:    Laboratory Studies:  No visits with results within 6 Month(s) from this visit. Latest known visit with results is:   Office Visit on 12/27/2021   Component Date Value Ref Range Status    Prostate Specific Ag 12/27/2021 1.4  0.01 - 4.0 ng/mL Final    Comment: Method used is [facility]  (NOTE)  Many types of test methods are used to measure PSA and can yield   different results on any given specimen. Therefore PSA results from   different laboratories on the same patient are not directly   comparable. In addition, PSA values by themselves should not be   interpreted as the presence or absence of cancer. PSA values used to   monitor for biochemical recurrence of prostate cancer should be   interpreted in accordance with current clinical guidelines (e.g. the   2013 American Urological Association (AUA) guidelines and the 2015    Association of Urology (EAU) guidelines).       Color 12/27/2021 YELLOW/STRAW    Final    Color Reference Range: Straw, Yellow or Dark Yellow    Appearance 12/27/2021 CLEAR  CLEAR   Final    Specific gravity 12/27/2021 1.012  1.003 - 1.030   Final    pH (UA) 12/27/2021 5.0  5.0 - 8.0   Final    Protein 12/27/2021 Negative  Negative mg/dL Final    Glucose 12/27/2021 Negative  Negative mg/dL Final    Ketone 12/27/2021 Negative  Negative mg/dL Final    Bilirubin 12/27/2021 Negative  Negative   Final    Blood 12/27/2021 Negative  Negative   Final    Urobilinogen 12/27/2021 0.2  0.2 - 1.0 EU/dL Final    Nitrites 12/27/2021 Negative  Negative   Final    Leukocyte Esterase 12/27/2021 Negative  Negative   Final    UA:UC IF INDICATED 12/27/2021 CULTURE NOT INDICATED BY UA RESULT  CULTURE NOT INDICATED BY UA RESULT   Final    WBC 12/27/2021 0-4  0 - 4 /hpf Final    RBC 12/27/2021 0-5  0 - 5 /hpf Final    Epithelial cells 12/27/2021 FEW  FEW /lpf Final    Comment: Epithelial cell category consists of squamous cells and /or transitional  urothelial cells. Renal tubular cells, if present, are separately identified as  such. Bacteria 12/27/2021 Negative  Negative /hpf Final    Hyaline cast 12/27/2021 0-2  0 - 5 /lpf Final    TSH 12/27/2021 1.07  0.36 - 3.74 uIU/mL Final    Comment:   Due to TSH heterogeneity, both structurally and degree of glycosylation,  monoclonal antibodies used in the TSH assay may not accurately quantitate TSH. Therefore, this result should be correlated with clinical findings as well as  with other assessments of thyroid function, e.g., free T4, free T3. Sodium 12/27/2021 138  136 - 145 mmol/L Final    Potassium 12/27/2021 4.7  3.5 - 5.1 mmol/L Final    Chloride 12/27/2021 108  97 - 108 mmol/L Final    CO2 12/27/2021 27  21 - 32 mmol/L Final    Anion gap 12/27/2021 3 (A)  5 - 15 mmol/L Final    Glucose 12/27/2021 121 (A)  65 - 100 mg/dL Final    BUN 12/27/2021 13  6 - 20 MG/DL Final    Creatinine 12/27/2021 1.16  0.70 - 1.30 MG/DL Final    BUN/Creatinine ratio 12/27/2021 11 (A)  12 - 20   Final    GFR est AA 12/27/2021 >60  >60 ml/min/1.73m2 Final    GFR est non-AA 12/27/2021 >60  >60 ml/min/1.73m2 Final    Comment: Estimated GFR is calculated using the IDMS-traceable Modification of Diet in  Renal Disease (MDRD) Study equation, reported for both  Americans  (GFRAA) and non- Americans (GFRNA), and normalized to 1.73m2 body  surface area. The physician must decide which value applies to the patient. Calcium 12/27/2021 9.0  8.5 - 10.1 MG/DL Final    Bilirubin, total 12/27/2021 0.8  0.2 - 1.0 MG/DL Final    ALT (SGPT) 12/27/2021 30  12 - 78 U/L Final    AST (SGOT) 12/27/2021 21  15 - 37 U/L Final    Alk.  phosphatase 12/27/2021 88  45 - 117 U/L Final    Protein, total 12/27/2021 7.0  6.4 - 8.2 g/dL Final    Albumin 12/27/2021 4.2  3.5 - 5.0 g/dL Final    Globulin 12/27/2021 2.8  2.0 - 4.0 g/dL Final    A-G Ratio 12/27/2021 1.5  1.1 - 2.2   Final    Cholesterol, total 12/27/2021 156  <200 MG/DL Final    Triglyceride 12/27/2021 157 (A)  <150 MG/DL Final    Comment: Based on NCEP-ATP III:  Triglycerides <150 mg/dL  is considered normal, 150-199  mg/dL  borderline high,  200-499 mg/dL high and  greater than or equal to 500  mg/dL very high. HDL Cholesterol 12/27/2021 42  MG/DL Final    Comment: Based on NCEP ATP III, HDL Cholesterol <40 mg/dL is considered low and >60  mg/dL is elevated.       LDL, calculated 12/27/2021 82.6  0 - 100 MG/DL Final    Comment: Based on the NCEP-ATP: LDL-C concentrations are considered  optimal <100 mg/dL,  near optimal/above Normal 100-129 mg/dL Borderline High: 130-159, High: 160-189  mg/dL Very High: Greater than or equal to 190 mg/dL      VLDL, calculated 12/27/2021 31.4  MG/DL Final    CHOL/HDL Ratio 12/27/2021 3.7  0.0 - 5.0   Final    CK 12/27/2021 54  39 - 308 U/L Final    WBC 12/27/2021 5.3  4.1 - 11.1 K/uL Final    RBC 12/27/2021 4.79  4.10 - 5.70 M/uL Final    HGB 12/27/2021 15.5  12.1 - 17.0 g/dL Final    HCT 12/27/2021 44.7  36.6 - 50.3 % Final    MCV 12/27/2021 93.3  80.0 - 99.0 FL Final    MCH 12/27/2021 32.4  26.0 - 34.0 PG Final    MCHC 12/27/2021 34.7  30.0 - 36.5 g/dL Final    RDW 12/27/2021 13.0  11.5 - 14.5 % Final    PLATELET 86/11/6657 157  150 - 400 K/uL Final    MPV 12/27/2021 11.5  8.9 - 12.9 FL Final    NRBC 12/27/2021 0.0  0  WBC Final    ABSOLUTE NRBC 12/27/2021 0.00  0.00 - 0.01 K/uL Final    NEUTROPHILS 12/27/2021 59  32 - 75 % Final    LYMPHOCYTES 12/27/2021 23  12 - 49 % Final    MONOCYTES 12/27/2021 14 (A)  5 - 13 % Final    EOSINOPHILS 12/27/2021 3  0 - 7 % Final    BASOPHILS 12/27/2021 1  0 - 1 % Final    IMMATURE GRANULOCYTES 12/27/2021 0  0.0 - 0.5 % Final    ABS. NEUTROPHILS 12/27/2021 3.1  1.8 - 8.0 K/UL Final    ABS. LYMPHOCYTES 12/27/2021 1.2  0.8 - 3.5 K/UL Final    ABS. MONOCYTES 12/27/2021 0.8  0.0 - 1.0 K/UL Final    ABS. EOSINOPHILS 12/27/2021 0.1  0.0 - 0.4 K/UL Final    ABS. BASOPHILS 12/27/2021 0.0  0.0 - 0.1 K/UL Final    ABS. IMM. GRANS. 12/27/2021 0.0  0.00 - 0.04 K/UL Final    DF 12/27/2021 AUTOMATED    Final         Radiographic Studies:  XR Results (most recent):  Results from [facility] encounter on 02/27/10    XR FINGERS MINIMUM 2 VIEWS    Narrative      ICD Codes / Adm. Diagnosis:    /   LACERATION  Examination:  FINGER MIN 2 VWS R - [id number] - Feb 27 2010  5:11PM  Accession No:  [id number]  Reason:  REASON: INJURY      REPORT:  Three-view exam of the right fingers with attention to the ring finger shows  linear nondisplaced fracture limited to the tuft of the distal phalanx of  the ring finger. No foreign bodies seen. IMPRESSION: Nondisplaced hairline fracture tuft distal phalanx right ring  finger. Interpreting/Reading Doctor: Cayla Thao ([id number])  Transcribed: n/a on 02/27/2010  Approved: Cayla Thao ([id number])  02/27/2010        Distribution:  Attending Doctor: Joselin Alvares Doctor: Eliazar MORENO Results (most recent):  No results found for this or any previous visit. CT Results (most recent):  Results from Hospital Encounter encounter on 12/23/16    CT HEAD WO CONT    Narrative  EXAM:  CT HEAD WO CONT  Clinical history: Altered mental status, possible TIA  INDICATION:   TIA    COMPARISON: None. TECHNIQUE: Unenhanced CT of the head was performed using 5 mm images. Brain and  bone windows were generated. CT dose reduction was achieved through use of a  standardized protocol tailored for this examination and automatic exposure  control for dose modulation. FINDINGS:  The ventricles and sulci are normal in size, shape and configuration and  midline.  There is no significant white matter disease. There is no intracranial  hemorrhage, extra-axial collection, mass, mass effect or midline shift. The  basilar cisterns are open. No acute infarct is identified. The bone windows  demonstrate no abnormalities. The visualized portions of the paranasal sinuses  and mastoid air cells are clear. Impression  IMPRESSION:    Normal CT scan of the head. DEXA Results (most recent):  No results found for this or any previous visit. MRI Results (most recent):  Results from [place] [facility] encounter on 12/23/16    MRI BRAIN W WO CONT    Narrative  CLINICAL HISTORY: Orthostatic hypotension and dizziness    INDICATION: . Orthostatic hypotension and dizziness      COMPARISON: None    TECHNIQUE: MR examination of the brain includes axial and sagittal T1  pre-and-post contrast, axial T2, axial FLAIR, axial gradient echo, axial DWI,  coronal T1 postcontrast.    Contrast: The patient was administered gadolinium-based contrast material axial  and coronal T1-weighted postcontrast enhanced imaging was obtained. FINDINGS:  There are minimal scattered foci of increased T2 signal intensity in the  periventricular white matter and also in the subcortical centrum semiovale  corona radiata. There is mild sulcal and ventricular prominence. Minimal chronic  hemosiderin deposition in the periventricular white matter on the right. There is no intracranial mass, hemorrhage or evidence of acute infarction. There is no Chiari or syrinx. The pituitary and infundibulum are grossly  unremarkable. There is no skull base mass. Cerebellopontine angles are grossly  unremarkable. The major intracranial vascular flow-voids are unremarkable. No  evidence of demyelinating process. There is no abnormal parenchymal enhancement. There is no abnormal meningeal  enhancement. The cavernous sinuses are symmetric. Optic chiasm and infundibulum  grossly unremarkable. Orbits are grossly symmetric.  Dural venous sinuses are  grossly patent. The brain architecture is normal. There is no evidence of midline shift or  mass-effect. There are no extra-axial fluid collections. The mastoid air cells are well pneumatized and clear. Minimal frontal sinus disease. Impression  IMPRESSION:    No intracranial mass, hemorrhage or evidence of acute infarction. Mild chronic microvascular ischemic change and cerebral atrophy for patient age. Assessment/Plan:       ICD-10-CM ICD-9-CM    1. Routine adult health maintenance  Z00.00 V70.0 CBC WITH AUTOMATED DIFF      2. Essential hypertension  D92 071.3 METABOLIC PANEL, COMPREHENSIVE      MICROALBUMIN, UR, RAND W/ MICROALB/CREAT RATIO      3. Dyslipidemia  E78.5 272.4 LIPID PANEL      4. TIA (transient ischemic attack)  G45.9 435.9       5. Bilateral carotid artery stenosis  I65.23 433.10      433.30       6. Screening for colon cancer  Z12.11 V76.51 COLOGUARD TEST (FECAL DNA COLORECTAL CANCER SCREENING)                 Healthcare Maintenance:  - Preventive measures are reviewed as per above  - Up to date on routine interventions except as noted above  - Orders placed to update gaps as noted  - Notes: Cologuard ordered, fasting labs ordered, continue follow-up with vascular surgery for carotid artery stenosis    Essential Hypertension/Blood Pressure Management:   - Home BP Readings: not doing   - Current Control: optimal   - Target BP: <130/80 mmHg (TIA)   - Relevant BP Meds:  Key CAD CHF Meds               rosuvastatin (CRESTOR) 20 mg tablet (Taking) TAKE 1 TAB BY MOUTH NIGHTLY. olmesartan (BENICAR) 40 mg tablet (Taking) Take 1 Tablet by mouth daily for 360 days.     aspirin delayed-release 81 mg tablet (Taking) Take 1 Tab by mouth daily.               - Plan: continue current treatment regimen, continue current meds, dietary sodium restriction, regular aerobic exercise   - Notes: CCM, repeat labs pending    Hyperlipidemia/Dyslipidemia:   - Summary of Cardiovascular Risks and Goals:     LDL goal is under 80  hypertension  hyperlipidemia  prior CVA/TIA or known carotid artery disease   -   Lab Results   Component Value Date/Time    LDL, calculated 82.6 12/27/2021 08:47 AM    HDL Cholesterol 42 12/27/2021 08:47 AM       - Relevant Cholesterol Meds:  Key Antihyperlipidemia Meds               rosuvastatin (CRESTOR) 20 mg tablet (Taking) TAKE 1 TAB BY MOUTH NIGHTLY. - Cholesterol at target: yes   - Does patient meet USPSTF and ACC/AHA indications for pharmacotherapy (e.g., statin): yes   - GI symptoms with meds: NO   - Muscle aches with meds: NO   - Other Adverse effects with meds: NO   - Medication Plan: continue   - Notes: [facility]    ASCVD:   - Type: history of TIA   - Current Symptoms: No Symptoms, no angina   - History and Contributing Factors: elevated cholesterol, hypertension, and hx TIA  Key CAD CHF Meds               rosuvastatin (CRESTOR) 20 mg tablet (Taking) TAKE 1 TAB BY MOUTH NIGHTLY. olmesartan (BENICAR) 40 mg tablet (Taking) Take 1 Tablet by mouth daily for 360 days. aspirin delayed-release 81 mg tablet (Taking) Take 1 Tab by mouth daily. - Target BP: < 130/80 mmHg; see Blood Pressure section   - Statin? YES   - Antiplatelet and/or Anticoagulant? YES   - ACEI/ARB? YES   - Beta Blocker? NO   - Notes: [facility], continue GDMT      Carotid Artery Stenosis:   - Location: bilateral    - Last Imaging: Fall 2022   - Plaque Percent: 25-50%   - Previous interventions: had a Left Carotid Endarterectomy done and follows with Vascular Surgery   - Plaque Severity: Mild   - Life Expectancy > 5 years? YES   - Yearly ultrasound ordered? NO   - Statin treatment: YES   - Antithrombotic Therapy: YES   - Blood pressure management: YES   - Smoking Cessation Counseling Done: N/A   - Other therapeutic Lifestyle Changes counseled: YES      I have reviewed the patient's medical history in detail and updated the computerized patient record.       We had a prolonged discussion about these complex clinical issues and went over the various important aspects to consider. All questions were answered. Advised the patient to call back or return to office if symptoms do not improve, change in nature, or persist.     The patient was given an after visit summary or informed of [facility] which includes patient instructions, diagnoses, current medications, & vitals. he expressed understanding with the diagnosis and plan. Robi Short MD    Please note that this dictation was completed with Podio, the computer voice recognition software. Quite often unanticipated grammatical, syntax, homophones, and other interpretive errors are inadvertently transcribed by the computer software. Please disregard these errors. Please excuse any errors that have escaped final proofreading.

## 2023-01-04 ENCOUNTER — OFFICE VISIT (OUTPATIENT)
Dept: INTERNAL MEDICINE CLINIC | Age: 68
End: 2023-01-04
Payer: MEDICARE

## 2023-01-04 VITALS
WEIGHT: 185.7 LBS | RESPIRATION RATE: 16 BRPM | BODY MASS INDEX: 25.15 KG/M2 | SYSTOLIC BLOOD PRESSURE: 120 MMHG | DIASTOLIC BLOOD PRESSURE: 78 MMHG | HEIGHT: 72 IN | HEART RATE: 65 BPM | OXYGEN SATURATION: 97 %

## 2023-01-04 DIAGNOSIS — G45.9 TIA (TRANSIENT ISCHEMIC ATTACK): ICD-10-CM

## 2023-01-04 DIAGNOSIS — I10 ESSENTIAL HYPERTENSION: ICD-10-CM

## 2023-01-04 DIAGNOSIS — E78.5 DYSLIPIDEMIA: ICD-10-CM

## 2023-01-04 DIAGNOSIS — Z12.11 SCREENING FOR COLON CANCER: ICD-10-CM

## 2023-01-04 DIAGNOSIS — I65.23 BILATERAL CAROTID ARTERY STENOSIS: ICD-10-CM

## 2023-01-04 DIAGNOSIS — Z00.00 ROUTINE ADULT HEALTH MAINTENANCE: Primary | ICD-10-CM

## 2023-01-04 PROCEDURE — 1123F ACP DISCUSS/DSCN MKR DOCD: CPT | Performed by: INTERNAL MEDICINE

## 2023-01-04 PROCEDURE — 99214 OFFICE O/P EST MOD 30 MIN: CPT | Performed by: INTERNAL MEDICINE

## 2023-01-04 PROCEDURE — 3078F DIAST BP <80 MM HG: CPT | Performed by: INTERNAL MEDICINE

## 2023-01-04 PROCEDURE — 3074F SYST BP LT 130 MM HG: CPT | Performed by: INTERNAL MEDICINE

## 2023-01-04 NOTE — PATIENT INSTRUCTIONS
Colon Cancer Screening: Care Instructions  Overview     Colorectal cancer occurs in the colon or rectum. That's the lower part of your digestive system. It often starts in small growths called polyps in the colon or rectum. Polyps are usually found with screening tests. Depending on the type of test, any polyps found may be removed during the tests. Colorectal cancer usually does not cause symptoms at first. But regular tests can help find it early, before it spreads and becomes harder to treat. Your risk for colorectal cancer gets higher as you get older. Experts recommend starting screening at age 39 for people who are at average risk. Talk with your doctor about your risk and when to start and stop screening. You may have one of several tests. Follow-up care is a key part of your treatment and safety. Be sure to make and go to all appointments, and call your doctor if you are having problems. It's also a good idea to know your test results and keep a list of the medicines you take. What are the main screening tests for colon cancer? The screening tests are:  Stool tests. These include the guaiac fecal occult blood test (gFOBT), the fecal immunochemical test (FIT), and the combined fecal immunochemical test and stool DNA test (FIT-DNA). These tests check stool samples for signs of cancer. If your test is positive, you will need to have a colonoscopy. Sigmoidoscopy. This test lets your doctor look at the lining of your rectum and the lowest part of your colon. Your doctor uses a lighted tube called a sigmoidoscope. This test can't find cancers or polyps in the upper part of your colon. In some cases, polyps that are found can be removed. But if your doctor finds polyps, you will need to have a colonoscopy to check the upper part of your colon. Colonoscopy. This test lets your doctor look at the lining of your rectum and your entire colon. The doctor uses a thin, flexible tool called a colonoscope.  It can also be used to remove polyps or get a tissue sample (biopsy). A less common test is CT colonography (CTC). It's also called virtual colonoscopy. Who should be screened for colorectal cancer? Your risk for colorectal cancer gets higher as you get older. Experts recommend starting screening at age 39 for people who are at average risk. Talk with your doctor about your risk and when to start and stop screening. How often you need screening depends on the type of test you get:  Stool tests. Every year for FIT or gFOBT. Every 1 to 3 years for sDNA, also called FIT-DNA. Tests that look inside the colon. Every 5 years for sigmoidoscopy. (If you do the FIT test every year, you can get this test every 10 years.)  Every 5 years for CT colonography (virtual colonoscopy). Every 10 years for colonoscopy. Experts agree that people at higher risk may need to be tested sooner and more often. This includes people who have a strong family history of colon cancer. Talk to your doctor about which test is best for you and when to be tested. When should you call for help? Watch closely for changes in your health, and be sure to contact your doctor if:    You have any changes in your bowel habits. You have any problems. Where can you learn more? Go to http://matthew-migel.info/  Enter M541 in the search box to learn more about \"Colon Cancer Screening: Care Instructions. \"  Current as of: May 4, 2022               Content Version: 13.4  © 4917-1185 [employer]. Care instructions adapted under license by [employer] (which disclaims liability or warranty for this information). If you have questions about a medical condition or this instruction, always ask your healthcare professional. [place] [address] any warranty or liability for your use of this information.

## 2023-01-04 NOTE — PROGRESS NOTES
Chief Complaint   Patient presents with    Follow-up       1. \"Have you been to the ER, urgent care clinic since your last visit? Hospitalized since your last visit? \" No    2. \"Have you seen or consulted any other health care providers outside of the [address] [place] [address] since your last visit? \" No     3. For patients aged 39-70: Has the patient had a colonoscopy / FIT/ Cologuard? No      If the patient is female:    4. For patients aged 41-77: Has the patient had a mammogram within the past 2 years? No      5. For patients aged 21-65: Has the patient had a pap smear?  No

## 2023-01-05 LAB
ALBUMIN SERPL-MCNC: 4.4 G/DL (ref 3.5–5)
ALBUMIN/GLOB SERPL: 1.5 {RATIO} (ref 1.1–2.2)
ALP SERPL-CCNC: 87 U/L (ref 45–117)
ALT SERPL-CCNC: 38 U/L (ref 12–78)
ANION GAP SERPL CALC-SCNC: 5 MMOL/L (ref 5–15)
AST SERPL-CCNC: 23 U/L (ref 15–37)
BASOPHILS # BLD: 0.1 K/UL (ref 0–0.1)
BASOPHILS NFR BLD: 1 % (ref 0–1)
BILIRUB SERPL-MCNC: 0.8 MG/DL (ref 0.2–1)
BUN SERPL-MCNC: 14 MG/DL (ref 6–20)
BUN/CREAT SERPL: 11 (ref 12–20)
CALCIUM SERPL-MCNC: 9.6 MG/DL (ref 8.5–10.1)
CHLORIDE SERPL-SCNC: 107 MMOL/L (ref 97–108)
CHOLEST SERPL-MCNC: 167 MG/DL
CO2 SERPL-SCNC: 27 MMOL/L (ref 21–32)
CREAT SERPL-MCNC: 1.22 MG/DL (ref 0.7–1.3)
CREAT UR-MCNC: 105 MG/DL
DIFFERENTIAL METHOD BLD: NORMAL
EOSINOPHIL # BLD: 0.1 K/UL (ref 0–0.4)
EOSINOPHIL NFR BLD: 2 % (ref 0–7)
ERYTHROCYTE [DISTWIDTH] IN BLOOD BY AUTOMATED COUNT: 12.5 % (ref 11.5–14.5)
GLOBULIN SER CALC-MCNC: 2.9 G/DL (ref 2–4)
GLUCOSE SERPL-MCNC: 132 MG/DL (ref 65–100)
HCT VFR BLD AUTO: 45.1 % (ref 36.6–50.3)
HDLC SERPL-MCNC: 45 MG/DL
HDLC SERPL: 3.7 {RATIO} (ref 0–5)
HGB BLD-MCNC: 15 G/DL (ref 12.1–17)
IMM GRANULOCYTES # BLD AUTO: 0 K/UL (ref 0–0.04)
IMM GRANULOCYTES NFR BLD AUTO: 0 % (ref 0–0.5)
LDLC SERPL CALC-MCNC: 88.8 MG/DL (ref 0–100)
LYMPHOCYTES # BLD: 1.3 K/UL (ref 0.8–3.5)
LYMPHOCYTES NFR BLD: 18 % (ref 12–49)
MCH RBC QN AUTO: 31.4 PG (ref 26–34)
MCHC RBC AUTO-ENTMCNC: 33.3 G/DL (ref 30–36.5)
MCV RBC AUTO: 94.5 FL (ref 80–99)
MICROALBUMIN UR-MCNC: 1.11 MG/DL
MICROALBUMIN/CREAT UR-RTO: 11 MG/G (ref 0–30)
MONOCYTES # BLD: 0.9 K/UL (ref 0–1)
MONOCYTES NFR BLD: 13 % (ref 5–13)
NEUTS SEG # BLD: 4.6 K/UL (ref 1.8–8)
NEUTS SEG NFR BLD: 66 % (ref 32–75)
NRBC # BLD: 0 K/UL (ref 0–0.01)
NRBC BLD-RTO: 0 PER 100 WBC
PLATELET # BLD AUTO: 199 K/UL (ref 150–400)
PMV BLD AUTO: 11.7 FL (ref 8.9–12.9)
POTASSIUM SERPL-SCNC: 5 MMOL/L (ref 3.5–5.1)
PROT SERPL-MCNC: 7.3 G/DL (ref 6.4–8.2)
RBC # BLD AUTO: 4.77 M/UL (ref 4.1–5.7)
SODIUM SERPL-SCNC: 139 MMOL/L (ref 136–145)
TRIGL SERPL-MCNC: 166 MG/DL (ref ?–150)
VLDLC SERPL CALC-MCNC: 33.2 MG/DL
WBC # BLD AUTO: 6.9 K/UL (ref 4.1–11.1)

## 2023-01-05 NOTE — PROGRESS NOTES
Please call the patient regarding his diagnostic evaluation. Labs show mild hypertriglyceridemia but otherwise normal.  Continue with current care.

## 2023-05-23 SDOH — ECONOMIC STABILITY: HOUSING INSECURITY
IN THE LAST 12 MONTHS, WAS THERE A TIME WHEN YOU DID NOT HAVE A STEADY PLACE TO SLEEP OR SLEPT IN A SHELTER (INCLUDING NOW)?: NO

## 2023-05-23 SDOH — ECONOMIC STABILITY: TRANSPORTATION INSECURITY
IN THE PAST 12 MONTHS, HAS LACK OF TRANSPORTATION KEPT YOU FROM MEETINGS, WORK, OR FROM GETTING THINGS NEEDED FOR DAILY LIVING?: NO

## 2023-05-23 SDOH — HEALTH STABILITY: PHYSICAL HEALTH: ON AVERAGE, HOW MANY DAYS PER WEEK DO YOU ENGAGE IN MODERATE TO STRENUOUS EXERCISE (LIKE A BRISK WALK)?: 5 DAYS

## 2023-05-23 SDOH — ECONOMIC STABILITY: FOOD INSECURITY: WITHIN THE PAST 12 MONTHS, THE FOOD YOU BOUGHT JUST DIDN'T LAST AND YOU DIDN'T HAVE MONEY TO GET MORE.: NEVER TRUE

## 2023-05-23 SDOH — ECONOMIC STABILITY: INCOME INSECURITY: HOW HARD IS IT FOR YOU TO PAY FOR THE VERY BASICS LIKE FOOD, HOUSING, MEDICAL CARE, AND HEATING?: NOT HARD AT ALL

## 2023-05-23 SDOH — ECONOMIC STABILITY: FOOD INSECURITY: WITHIN THE PAST 12 MONTHS, YOU WORRIED THAT YOUR FOOD WOULD RUN OUT BEFORE YOU GOT MONEY TO BUY MORE.: NEVER TRUE

## 2023-05-23 SDOH — HEALTH STABILITY: PHYSICAL HEALTH: ON AVERAGE, HOW MANY MINUTES DO YOU ENGAGE IN EXERCISE AT THIS LEVEL?: 10 MIN

## 2023-05-23 ASSESSMENT — PATIENT HEALTH QUESTIONNAIRE - PHQ9
SUM OF ALL RESPONSES TO PHQ QUESTIONS 1-9: 0
1. LITTLE INTEREST OR PLEASURE IN DOING THINGS: 0
2. FEELING DOWN, DEPRESSED OR HOPELESS: 0
SUM OF ALL RESPONSES TO PHQ9 QUESTIONS 1 & 2: 0
SUM OF ALL RESPONSES TO PHQ QUESTIONS 1-9: 0

## 2023-05-23 ASSESSMENT — LIFESTYLE VARIABLES
HAVE YOU OR SOMEONE ELSE BEEN INJURED AS A RESULT OF YOUR DRINKING: NO
HOW OFTEN DURING THE LAST YEAR HAVE YOU NEEDED AN ALCOHOLIC DRINK FIRST THING IN THE MORNING TO GET YOURSELF GOING AFTER A NIGHT OF HEAVY DRINKING: 0
HOW OFTEN DURING THE LAST YEAR HAVE YOU BEEN UNABLE TO REMEMBER WHAT HAPPENED THE NIGHT BEFORE BECAUSE YOU HAD BEEN DRINKING: 0
HOW OFTEN DURING THE LAST YEAR HAVE YOU FAILED TO DO WHAT WAS NORMALLY EXPECTED FROM YOU BECAUSE OF DRINKING: NEVER
HOW OFTEN DURING THE LAST YEAR HAVE YOU HAD A FEELING OF GUILT OR REMORSE AFTER DRINKING: NEVER
HOW OFTEN DURING THE LAST YEAR HAVE YOU NEEDED AN ALCOHOLIC DRINK FIRST THING IN THE MORNING TO GET YOURSELF GOING AFTER A NIGHT OF HEAVY DRINKING: NEVER
HOW OFTEN DURING THE LAST YEAR HAVE YOU HAD A FEELING OF GUILT OR REMORSE AFTER DRINKING: 0
HAS A RELATIVE, FRIEND, DOCTOR, OR ANOTHER HEALTH PROFESSIONAL EXPRESSED CONCERN ABOUT YOUR DRINKING OR SUGGESTED YOU CUT DOWN: 0
HOW OFTEN DURING THE LAST YEAR HAVE YOU FAILED TO DO WHAT WAS NORMALLY EXPECTED FROM YOU BECAUSE OF DRINKING: 0
HAS A RELATIVE, FRIEND, DOCTOR, OR ANOTHER HEALTH PROFESSIONAL EXPRESSED CONCERN ABOUT YOUR DRINKING OR SUGGESTED YOU CUT DOWN: NO
HOW MANY STANDARD DRINKS CONTAINING ALCOHOL DO YOU HAVE ON A TYPICAL DAY: 1
HAVE YOU OR SOMEONE ELSE BEEN INJURED AS A RESULT OF YOUR DRINKING: 0
HOW OFTEN DURING THE LAST YEAR HAVE YOU FOUND THAT YOU WERE NOT ABLE TO STOP DRINKING ONCE YOU HAD STARTED: NEVER
HOW OFTEN DURING THE LAST YEAR HAVE YOU BEEN UNABLE TO REMEMBER WHAT HAPPENED THE NIGHT BEFORE BECAUSE YOU HAD BEEN DRINKING: NEVER
HOW OFTEN DURING THE LAST YEAR HAVE YOU FOUND THAT YOU WERE NOT ABLE TO STOP DRINKING ONCE YOU HAD STARTED: 0
HOW OFTEN DO YOU HAVE A DRINK CONTAINING ALCOHOL: 4 OR MORE TIMES A WEEK
HOW OFTEN DO YOU HAVE SIX OR MORE DRINKS ON ONE OCCASION: 1
HOW MANY STANDARD DRINKS CONTAINING ALCOHOL DO YOU HAVE ON A TYPICAL DAY: 1 OR 2
HOW OFTEN DO YOU HAVE A DRINK CONTAINING ALCOHOL: 5

## 2023-05-26 ENCOUNTER — OFFICE VISIT (OUTPATIENT)
Facility: CLINIC | Age: 68
End: 2023-05-26
Payer: MEDICARE

## 2023-05-26 VITALS
HEART RATE: 63 BPM | HEIGHT: 72 IN | BODY MASS INDEX: 25.12 KG/M2 | DIASTOLIC BLOOD PRESSURE: 72 MMHG | SYSTOLIC BLOOD PRESSURE: 122 MMHG | OXYGEN SATURATION: 100 % | RESPIRATION RATE: 18 BRPM | WEIGHT: 185.5 LBS

## 2023-05-26 DIAGNOSIS — R73.03 PREDIABETES: ICD-10-CM

## 2023-05-26 DIAGNOSIS — I10 ESSENTIAL HYPERTENSION: ICD-10-CM

## 2023-05-26 DIAGNOSIS — Z00.00 MEDICARE ANNUAL WELLNESS VISIT, SUBSEQUENT: Primary | ICD-10-CM

## 2023-05-26 DIAGNOSIS — E78.00 HYPERCHOLESTEROLEMIA: ICD-10-CM

## 2023-05-26 DIAGNOSIS — I65.23 BILATERAL CAROTID ARTERY STENOSIS: ICD-10-CM

## 2023-05-26 DIAGNOSIS — Z86.73 HISTORY OF TIA (TRANSIENT ISCHEMIC ATTACK): ICD-10-CM

## 2023-05-26 PROCEDURE — G0439 PPPS, SUBSEQ VISIT: HCPCS | Performed by: INTERNAL MEDICINE

## 2023-05-26 PROCEDURE — 3074F SYST BP LT 130 MM HG: CPT | Performed by: INTERNAL MEDICINE

## 2023-05-26 PROCEDURE — 1123F ACP DISCUSS/DSCN MKR DOCD: CPT | Performed by: INTERNAL MEDICINE

## 2023-05-26 PROCEDURE — 3078F DIAST BP <80 MM HG: CPT | Performed by: INTERNAL MEDICINE

## 2023-05-26 RX ORDER — OLMESARTAN MEDOXOMIL 40 MG/1
40 TABLET ORAL DAILY
Qty: 90 TABLET | Refills: 3 | Status: SHIPPED | OUTPATIENT
Start: 2023-05-26 | End: 2024-05-20

## 2023-05-26 RX ORDER — ROSUVASTATIN CALCIUM 20 MG/1
TABLET, COATED ORAL
Qty: 90 TABLET | Refills: 3 | Status: SHIPPED | OUTPATIENT
Start: 2023-05-26

## 2023-05-26 RX ORDER — SILDENAFIL 50 MG/1
50 TABLET, FILM COATED ORAL DAILY PRN
Qty: 18 TABLET | Refills: 11 | Status: SHIPPED | OUTPATIENT
Start: 2023-05-26 | End: 2024-05-20

## 2023-05-26 ASSESSMENT — ENCOUNTER SYMPTOMS
GASTROINTESTINAL NEGATIVE: 1
ALLERGIC/IMMUNOLOGIC NEGATIVE: 1
RESPIRATORY NEGATIVE: 1
EYES NEGATIVE: 1

## 2023-05-26 NOTE — PATIENT INSTRUCTIONS
if it is hard for you to communicate by telephone. Try to learn a listening technique called speechreading. It is not lipreading. You pay attention to people's gestures, expressions, posture, and tone of voice. These clues can help you understand what a person is saying. Face the person you are talking to, and have them face you. Make sure the lighting is good. You need to see the other person's face clearly. Think about counseling if you need help to adjust to your hearing loss. When should you call for help? Watch closely for changes in your health, and be sure to contact your doctor if:    You think your hearing is getting worse.     You have new symptoms, such as dizziness or nausea. Where can you learn more? Go to http://www.ross.com/ and enter R798 to learn more about \"Hearing Loss: Care Instructions. \"  Current as of: May 4, 2022               Content Version: 13.6  © 2006-2023 [employer]. Care instructions adapted under license by [facility] ([facility]). If you have questions about a medical condition or this instruction, always ask your healthcare professional. [place] [address] any warranty or liability for your use of this information. A Healthy Heart: Care Instructions  Your Care Instructions     Coronary artery disease, also called heart disease, occurs when a substance called plaque builds up in the vessels that supply oxygen-rich blood to your heart muscle. This can narrow the blood vessels and reduce blood flow. A heart attack happens when blood flow is completely blocked. A high-fat diet, smoking, and other factors increase the risk of heart disease. Your doctor has found that you have a chance of having heart disease. You can do lots of things to keep your heart healthy. It may not be easy, but you can change your diet, exercise more, and quit smoking. These steps really work to lower your chance of heart disease.   Follow-up care is a key part

## 2023-05-26 NOTE — PROGRESS NOTES
Chief Complaint   Patient presents with    Medicare AWV     /72 (Site: Left Upper Arm, Position: Sitting, Cuff Size: Medium Adult)   Pulse 63   Resp 18   Ht 6' (1.829 m)   Wt 185 lb 8 oz (84.1 kg)   SpO2 100%   BMI 25.16 kg/m²     1. \"Have you been to the ER, urgent care clinic since your last visit? Hospitalized since your last visit? \" NO    2. \"Have you seen or consulted any other health care providers outside of the [address] [place] [address] since your last visit? \" NO     3. For patients aged 39-70: Has the patient had a colonoscopy / FIT/ Cologuard? No      If the patient is female:    4. For patients aged 41-77: Has the patient had a mammogram within the past 2 years? NO      5. For patients aged 21-65: Has the patient had a pap smear?  NO
Never   Substance and Sexual Activity    Alcohol use: Yes     Alcohol/week: 20.0 standard drinks    Drug use: No     Social Determinants of Health     Physical Activity: Insufficiently Active    Days of Exercise per Week: 5 days    Minutes of Exercise per Session: 10 min       Family History:  Family History   Problem Relation Age of Onset    No Known Problems Mother     Diabetes Father     Hypertension Father     Cataracts Father        Immunizations:  Immunization History   Administered Date(s) Administered    COVID-19, MODERNA BLUE border, Primary or Immunocompromised, (age 12y+), IM, 100 mcg/0.5mL 03/06/2021, 04/03/2021, 12/01/2021    Pneumococcal, PCV20, PREVNAR 21, (age 18y+), IM, 0.5mL 07/05/2022        Healthcare Maintenance:  Health Maintenance   Topic Date Due    DTaP/Tdap/Td vaccine (1 - Tdap) Never done    Colorectal Cancer Screen  Never done    Shingles vaccine (1 of 2) Never done    A1C test (Diabetic or Prediabetic)  01/05/2022    COVID-19 Vaccine (4 - Booster for Moderna series) 01/26/2022    Annual Wellness Visit (AWV)  05/26/2023    Lipids  01/04/2024    Depression Screen  05/23/2024    Pneumococcal 65+ years Vaccine  Completed    Hepatitis C screen  Completed    Hepatitis A vaccine  Aged Out    Hib vaccine  Aged Out    Meningococcal (ACWY) vaccine  Aged Out    Prostate Specific Antigen (PSA) Screening or Monitoring  Discontinued        Review of Systems:  ROS:  Review of Systems   Constitutional: Negative. HENT: Negative. Eyes: Negative. Respiratory: Negative. Cardiovascular: Negative. Gastrointestinal: Negative. Endocrine: Negative. Genitourinary: Negative. Musculoskeletal: Negative. Skin: Negative. Allergic/Immunologic: Negative. Neurological: Negative. Hematological: Negative. Psychiatric/Behavioral: Negative. All other systems reviewed and are negative.      ROS otherwise negative      Objective:     Vital Signs:  /72 (Site: Left Upper Arm,